# Patient Record
Sex: FEMALE | Race: WHITE | NOT HISPANIC OR LATINO | Employment: FULL TIME | URBAN - METROPOLITAN AREA
[De-identification: names, ages, dates, MRNs, and addresses within clinical notes are randomized per-mention and may not be internally consistent; named-entity substitution may affect disease eponyms.]

---

## 2023-06-05 ENCOUNTER — TELEPHONE (OUTPATIENT)
Dept: PRIMARY CARE | Facility: CLINIC | Age: 63
End: 2023-06-05

## 2023-06-05 ENCOUNTER — OFFICE VISIT (OUTPATIENT)
Dept: PRIMARY CARE | Facility: CLINIC | Age: 63
End: 2023-06-05
Payer: COMMERCIAL

## 2023-06-05 VITALS
HEIGHT: 64 IN | OXYGEN SATURATION: 96 % | DIASTOLIC BLOOD PRESSURE: 74 MMHG | SYSTOLIC BLOOD PRESSURE: 117 MMHG | WEIGHT: 135.4 LBS | TEMPERATURE: 97.9 F | HEART RATE: 80 BPM | BODY MASS INDEX: 23.12 KG/M2

## 2023-06-05 DIAGNOSIS — J20.9 ACUTE BRONCHITIS, UNSPECIFIED ORGANISM: Primary | ICD-10-CM

## 2023-06-05 DIAGNOSIS — J01.00 ACUTE NON-RECURRENT MAXILLARY SINUSITIS: ICD-10-CM

## 2023-06-05 PROBLEM — K43.2 INCISIONAL HERNIA, WITHOUT OBSTRUCTION OR GANGRENE: Status: ACTIVE | Noted: 2023-06-05

## 2023-06-05 PROBLEM — U07.1 COVID-19 VIRUS INFECTION: Status: ACTIVE | Noted: 2023-06-05

## 2023-06-05 PROBLEM — F41.8 SITUATIONAL ANXIETY: Status: ACTIVE | Noted: 2023-06-05

## 2023-06-05 PROBLEM — F40.243 FEAR OF FLYING: Status: ACTIVE | Noted: 2023-06-05

## 2023-06-05 PROBLEM — R92.8 ABNORMAL MAMMOGRAM: Status: ACTIVE | Noted: 2023-06-05

## 2023-06-05 PROBLEM — E78.00 ELEVATED LDL CHOLESTEROL LEVEL: Status: ACTIVE | Noted: 2023-06-05

## 2023-06-05 PROBLEM — E55.9 VITAMIN D INSUFFICIENCY: Status: ACTIVE | Noted: 2023-06-05

## 2023-06-05 PROBLEM — L30.9 ECZEMA: Status: ACTIVE | Noted: 2023-06-05

## 2023-06-05 PROCEDURE — 1036F TOBACCO NON-USER: CPT | Performed by: STUDENT IN AN ORGANIZED HEALTH CARE EDUCATION/TRAINING PROGRAM

## 2023-06-05 PROCEDURE — 99213 OFFICE O/P EST LOW 20 MIN: CPT | Performed by: STUDENT IN AN ORGANIZED HEALTH CARE EDUCATION/TRAINING PROGRAM

## 2023-06-05 RX ORDER — AMOXICILLIN AND CLAVULANATE POTASSIUM 875; 125 MG/1; MG/1
875 TABLET, FILM COATED ORAL 2 TIMES DAILY
Qty: 20 TABLET | Refills: 0 | Status: SHIPPED | OUTPATIENT
Start: 2023-06-05 | End: 2023-06-15

## 2023-06-05 RX ORDER — METHYLPREDNISOLONE 4 MG/1
TABLET ORAL
Qty: 21 TABLET | Refills: 0 | Status: SHIPPED | OUTPATIENT
Start: 2023-06-05 | End: 2023-06-12

## 2023-06-05 RX ORDER — ALBUTEROL SULFATE 90 UG/1
2 AEROSOL, METERED RESPIRATORY (INHALATION) EVERY 6 HOURS PRN
Qty: 18 G | Refills: 11 | Status: SHIPPED | OUTPATIENT
Start: 2023-06-05 | End: 2023-11-06 | Stop reason: SDUPTHER

## 2023-06-05 RX ORDER — FLUTICASONE PROPIONATE 50 MCG
1 SPRAY, SUSPENSION (ML) NASAL DAILY
Qty: 16 G | Refills: 5 | Status: SHIPPED | OUTPATIENT
Start: 2023-06-05 | End: 2023-11-06 | Stop reason: ALTCHOICE

## 2023-06-05 ASSESSMENT — ENCOUNTER SYMPTOMS
DIAPHORESIS: 0
NAUSEA: 1
SINUS PAIN: 0
SINUS PRESSURE: 1
LIGHT-HEADEDNESS: 1
SHORTNESS OF BREATH: 1
SORE THROAT: 1
WHEEZING: 1
VOMITING: 1
RHINORRHEA: 1
DIZZINESS: 0
DIARRHEA: 1
CHILLS: 1
DYSURIA: 0
FATIGUE: 1
CHEST TIGHTNESS: 1

## 2023-06-05 ASSESSMENT — PATIENT HEALTH QUESTIONNAIRE - PHQ9
2. FEELING DOWN, DEPRESSED OR HOPELESS: NOT AT ALL
1. LITTLE INTEREST OR PLEASURE IN DOING THINGS: NOT AT ALL
SUM OF ALL RESPONSES TO PHQ9 QUESTIONS 1 AND 2: 0

## 2023-06-05 NOTE — ASSESSMENT & PLAN NOTE
Symptoms most likely are viral in etiology however with maxillary sinus tenderness and symptoms similar to prior bronchitis will empirically treat with course of Augmentin.  Given the persistent coughing and wheezing with coughing prescribed a Medrol Dosepak as well as albuterol inhaler as needed.  Patient to let us know if using inhaler every day.  Prescribed Flonase to help with postnasal drip.  Advised to drink plenty of fluids rest and try nasal saline rinses.  Advised to consider regular Mucinex instead of Mucinex DM.  Advised of common side effects of medications including insomnia and increased appetite with Medrol, diarrhea with Augmentin and to let us know if persistent, bloody noses with Flonase no regular, and palpitations with albuterol.  Follow-up with me as needed.  Follow-up with Dr. Jacques as previously scheduled.

## 2023-06-05 NOTE — TELEPHONE ENCOUNTER
She says she has bronchitis for the past 2 days, feeling worse today, now sore throat and ear pain, she would like to see you today, please advise

## 2023-06-05 NOTE — PROGRESS NOTES
"Subjective   Patient ID: Michelle Abbott is a 62 y.o. female who presents for Bronchitis.  Friday felt fatigue and took a nap, Saturday felt a little better but Saturday night developed persistent cough. Did covid tests which were negative. Yesterday had chills, yellowish mucus and crackling and can't take deep breaths due to worsening cough. This feels similar to prior bronchitis episodes. Has ear pressure too. Sleeping on 2 pillows at night. Temp was 99-100F at home on Saturday. Taking mucinex dm for coughing q12 hours. No sick contacts. No recent travel.         Review of Systems   Constitutional:  Positive for chills and fatigue. Negative for diaphoresis.   HENT:  Positive for ear pain, postnasal drip, rhinorrhea, sinus pressure (mid frontal) and sore throat. Negative for ear discharge, hearing loss and sinus pain.    Respiratory:  Positive for chest tightness, shortness of breath and wheezing.    Gastrointestinal:  Positive for diarrhea (Last 4 days), nausea and vomiting.   Genitourinary:  Negative for dysuria.   Neurological:  Positive for light-headedness (short lived when standing and resolves within seconds.). Negative for dizziness.       /74   Pulse 80   Temp 36.6 °C (97.9 °F)   Ht 1.613 m (5' 3.5\")   Wt 61.4 kg (135 lb 6.4 oz)   SpO2 96%   BMI 23.61 kg/m²     Objective   Physical Exam  Vitals reviewed.   HENT:      Head: Normocephalic.      Comments: Mid frontal tenderness, bilateral maxillary sinus tenderness present.     Right Ear: Tympanic membrane, ear canal and external ear normal. There is no impacted cerumen.      Left Ear: Tympanic membrane, ear canal and external ear normal. There is no impacted cerumen.      Mouth/Throat:      Mouth: Mucous membranes are moist.      Pharynx: Oropharynx is clear. No oropharyngeal exudate or posterior oropharyngeal erythema.   Cardiovascular:      Rate and Rhythm: Normal rate and regular rhythm.   Pulmonary:      Effort: Pulmonary effort is normal. No " respiratory distress.      Breath sounds: Normal breath sounds. No rhonchi or rales.      Comments: Wheezing when coughing present  Abdominal:      General: There is no distension.   Musculoskeletal:         General: No deformity.      Cervical back: Neck supple. No tenderness.   Lymphadenopathy:      Cervical: No cervical adenopathy.   Skin:     Coloration: Skin is not jaundiced.   Neurological:      Mental Status: She is alert.   Psychiatric:         Mood and Affect: Mood normal.         Behavior: Behavior normal.         Assessment/Plan   Problem List Items Addressed This Visit          Respiratory    Acute bronchitis - Primary    Relevant Medications    albuterol 90 mcg/actuation inhaler    fluticasone (Flonase) 50 mcg/actuation nasal spray    amoxicillin-pot clavulanate (Augmentin) 875-125 mg tablet    methylPREDNISolone (Medrol Dospak) 4 mg tablets       Infectious/Inflammatory    Acute non-recurrent maxillary sinusitis     Symptoms most likely are viral in etiology however with maxillary sinus tenderness and symptoms similar to prior bronchitis will empirically treat with course of Augmentin.  Given the persistent coughing and wheezing with coughing prescribed a Medrol Dosepak as well as albuterol inhaler as needed.  Patient to let us know if using inhaler every day.  Prescribed Flonase to help with postnasal drip.  Advised to drink plenty of fluids rest and try nasal saline rinses.  Advised to consider regular Mucinex instead of Mucinex DM.  Advised of common side effects of medications including insomnia and increased appetite with Medrol, diarrhea with Augmentin and to let us know if persistent, bloody noses with Flonase no regular, and palpitations with albuterol.  Follow-up with me as needed.  Follow-up with Dr. Jacques as previously scheduled.         Relevant Medications    albuterol 90 mcg/actuation inhaler    fluticasone (Flonase) 50 mcg/actuation nasal spray    amoxicillin-pot clavulanate (Augmentin)  875-125 mg tablet    methylPREDNISolone (Medrol Dospak) 4 mg tablets

## 2023-11-05 NOTE — PROGRESS NOTES
Subjective   Patient ID: Michelle Abbott is a 63 y.o. female who presents for her annual physical       She is going to get the influenza vaccine done at work in 2023.    She is scheduled for her mammogram in 12/28/2023 at [place].     She is aware she needs to do the colonoscopy     When she has a full bowel she has pain at the incisional hernia site   When bending over in a certain way she has tenderness to the abdomen     She continues to have a cough since having COVID last year 2022.     She is sleeping well   She has good results with Buspar prn anxiety     HEALTH:  PAP 1/12 -, 12/14, 3/19 normal and Q 5   Vaginal 12/16 -, 3/19   Mammo 9/11, 1/17, 3/19, 10/2020, 10/2021, 12/2022, ordered 11/2023  US of breasts normal 5/19  BD 2007 neg and insurance does not cover BD  Colon 2000, 3/17 and ordered 11/2022 and again 11/2023  Ca cardiac score ordered but not done   EKG 11/10 , 2012, 12/16, 3/19, 10/2021   Urine 2014 , 12/16, 3/19 -, 10/2020   Hep C 3/19 -  Hep B series 2003   FLu 2014 , 9/2016, 2018, 2019, 10/2020, 10/2021, 10/2022 at work   TDAP 12/14   Prevnar never   Pneumovax 11/10   Shingrix- 3/16/2023 and 8/18/2023  Pfizer CVD 3/20/2021 and 4/10/2021 booster 11/26/2021   Ophth- Last seen in 2023. She wears contacts. Mild cataract OD, no glaucoma or MD.         Review of Systems  All systems negative except those listed in the HPI      Past Medical, Surgical, and Family History reviewed and updated in chart.  Reviewed all medications by prescribing practitioner or clinical pharmacist   (such as prescriptions, OTCs, herbal therapies and supplements) and documented in the medical record      Objective   Visit Vitals  /64 (BP Location: Left arm, Patient Position: Sitting)   Pulse 68   Temp 36.2 °C (97.2 °F) (Temporal)    Body mass index is 23.71 kg/m².     Physical Exam  Vitals reviewed.   Constitutional:       Appearance: Normal appearance. She is normal weight.   HENT:      Head: Normocephalic.       Right Ear: Tympanic membrane, ear canal and external ear normal.      Left Ear: Tympanic membrane, ear canal and external ear normal.      Ears:      Comments: Cerumen build up without impaction right ear      Nose: Nose normal.      Mouth/Throat:      Pharynx: Oropharynx is clear.   Eyes:      Conjunctiva/sclera: Conjunctivae normal.   Cardiovascular:      Rate and Rhythm: Normal rate and regular rhythm.      Pulses: Normal pulses.      Heart sounds: Normal heart sounds.   Pulmonary:      Effort: Pulmonary effort is normal.      Breath sounds: Normal breath sounds.      Comments: cough dry, no wheezing, rattles or tightness   Chest:      Comments: Breast exam normal except dense breasts bilaterally  Abdominal:      General: Bowel sounds are normal.      Palpations: Abdomen is soft.      Comments: Small hernia lateral part of the incision to the right noted   Musculoskeletal:         General: Normal range of motion.      Cervical back: Normal range of motion and neck supple.   Skin:     General: Skin is warm.   Neurological:      General: No focal deficit present.      Mental Status: She is alert and oriented to person, place, and time.   Psychiatric:         Mood and Affect: Mood normal.         Behavior: Behavior normal.         Thought Content: Thought content normal.         Judgment: Judgment normal.       Assessment/Plan   Problem List Items Addressed This Visit       Eczema    Elevated LDL cholesterol level    Incisional hernia, without obstruction or gangrene    Vitamin D insufficiency    Relevant Orders    Vitamin D 25-Hydroxy,Total (for eval of Vitamin D levels)     Other Visit Diagnoses       Healthcare maintenance    -  Primary    Relevant Orders    CBC    Comprehensive Metabolic Panel    Lipid Panel    TSH with reflex to Free T4 if abnormal    Visit for screening mammogram        Colon cancer screening        Acute bronchitis, unspecified organism        Acute non-recurrent maxillary sinusitis         Chronic cough                Annual physical completed  Annual labs ordered   Medication reconciliation performed with patient     Health Maintenence completed  -  Discussed healthy diet and regular exercise.    -  Physical exam overall unremarkable. Immunizations reviewed and updated accordingly. Healthy lifestyle choices discussed (tobacco avoidance, appropriate alcohol use, avoidance of illicit substances).   -  Patient is wearing seatbelt.   -  Screening lab work ordered as indicated.    -  Age appropriate screening tests reviewed with patient.     Cerumen build up without impaction right ear:  Recommend using equal parts warm water and peroxide drops in the ear to help with removal and avoid impaction       Her weight/ BMI is in normal range in office, recommend she maintain   Her weight is 136 pounds with BMI at 23.71 IO 11/2023.      BP have been in normal range in office. We will continue to monitor.   EKG was normal 10/2021, no LVH or strain pattern noted   Recommend she monitor her BP at home periodically and call with elevated readings      I have spent 15 min face to face with this patient discussing their cardiac risk and behavioral therapies of nutrition choices and exercise. We are trying to eliminate habits that are contributing to their cardiac risk.  We agreed on a plan of how they can reduce their current CV risk   The patient's 10 yr CV risk was estimated at 4.3 % 11/2023     Elevated lipids: Labs ordered and we will adjust if indicated 11/2023  Explained goal for LDL to be less than 100 and ideally less than 70   Ca cardiac score ordered but not done   Discussed diet and exercise for lipid control      GERD and chronic bronchitis: On exam: cough dry, no wheezing, rattles or tightness 11/2023. She continues to have a cough since having COVID last year 2022.    She has issues with GERD   Continue Mucinex DM for the bronchitis prn  Continue Flonase NS BID and Tessalon Perles 100 mg prn. Refilled  Flonase and Tessalon Perles 11/2023  Continue OTC Pepcid 20 mg QHS for at least 2 months   Continue albuterol inhaler 2 puffs Q 6 hours. Refilled 11/2023  Recommend humidifier use at night during winter months   Recommend using an air purifier in the home   CXR 11/2022 No evidence of acute cardiopulmonary process.      Increased situational anxiety and insomnia: her sleep has improved   She has rare use of Buspar. Buspar helps her sleep and decreases her anxiety   She has a fear of flying and uses Xanax for trips only.   Continue Buspar 5 mg prn. Refilled 11/2023.  Recommend trying Melatonin 5 mcg at night  Discussed mindful meditation and she can look on line at [facility] for guided meditation   Recommend she begin keeping a gratitude journal.   Explained exercise will help with anxiety      RLL pain: This could be adhesions pulling from mesh placement or hernia.   When she has a full bowel she has pain at the incisional hernia site    Small hernia lateral part of the incision to the right noted on exam.   We discussed risks and benefits of hernia removal.  She saw Dr Richardson in general surgery in 4/19  If she experiences nausea, emesis, or acute abdominal pain she is to go to the ED immediately  She is to avoid heavy lifting      Right flank pain, RLQ pain and emesis.   Seen in the ED on 11/23/2020   CT of abdomen 11/2020 showed 2 mm stone with mild hydronephrosis  Urine showed 1+ leukocytes, esterase and bacteria  Given a dose of Rocephin in the ED and discharged on Keflex, Percocet, Zofran and Flomax  Referred to urology - she never followed with urology.   She is doing fine now and changed her diet      She had mesh placed in the bladder in 2012 and that was helpful.     Sebaceous cyst:  It fluctuates in size.  Explained this may have to be removed surgically.      Eczema patch on top of left shoulder is intermittent   She has itching to the area at times and uses cream with good results   Prescription sent in for  Elocon cream to apply prn psoriasis      Vitamin D def: Labs ordered, we will adjust if indicated 11/2023  Continue scripted Vitamin D 50K UT twice weekly     PAP was normal in 3/19 and Q 5.   US of breast in 5/19 was normal   Mammo with jan normal in 12/2022 and ordered 11/2023. She is scheduled for her mammogram in 12/28/2023 at [place].   Breast exam normal except dense breasts bilaterally 11/2023     BD was normal in 2007. Continue OTC Citracal 600 mg BID, scripted Vitamin D 50K UT twice weekly and eat 2 servings of calcium enriched foods daily.  Discussed importance of weight bearing exercises.    Insurance does not cover BD     FmHx of colon cancer -  Colonoscopy 3/17 and Q 5 and ordered 11/2022 and again 11/2023     Ophth-   Last seen in 2023. She wears contacts. Mild cataract OD, no glaucoma or MD.          Hep C 3/19 -  Hep B series 2003   FLu 2014 , 9/2016, 2018, 2019, 10/2020, 10/2021, 10/2022 at work   TDAP 12/14   Prevnar never   Pneumovax 11/10   Shingrix- 3/16/2023 and 8/18/2023  Pfizer CVD 3/20/2021 and 4/10/2021 booster 11/26/2021      Some elements in the chart were copied from Dr. Jacques's last office visit with patient.   Notes have been updated where appropriate, and reflect my current medical decision making from today.      Return in 1 year for CPE or sooner if needed    Scribe Attestation  By signing my name below, I, Elisabet Scherer , Scribe   attest that this documentation has been prepared under the direction and in the presence of Ashleigh Jacques MD.

## 2023-11-06 ENCOUNTER — OFFICE VISIT (OUTPATIENT)
Dept: PRIMARY CARE | Facility: CLINIC | Age: 63
End: 2023-11-06
Payer: COMMERCIAL

## 2023-11-06 ENCOUNTER — LAB (OUTPATIENT)
Dept: LAB | Facility: LAB | Age: 63
End: 2023-11-06
Payer: COMMERCIAL

## 2023-11-06 VITALS
DIASTOLIC BLOOD PRESSURE: 64 MMHG | WEIGHT: 136 LBS | HEIGHT: 64 IN | TEMPERATURE: 97.2 F | BODY MASS INDEX: 23.22 KG/M2 | SYSTOLIC BLOOD PRESSURE: 128 MMHG | OXYGEN SATURATION: 98 % | HEART RATE: 68 BPM

## 2023-11-06 DIAGNOSIS — R05.3 CHRONIC COUGH: ICD-10-CM

## 2023-11-06 DIAGNOSIS — K43.2 INCISIONAL HERNIA, WITHOUT OBSTRUCTION OR GANGRENE: ICD-10-CM

## 2023-11-06 DIAGNOSIS — Z12.11 COLON CANCER SCREENING: ICD-10-CM

## 2023-11-06 DIAGNOSIS — L30.9 ECZEMA, UNSPECIFIED TYPE: ICD-10-CM

## 2023-11-06 DIAGNOSIS — Z12.31 VISIT FOR SCREENING MAMMOGRAM: ICD-10-CM

## 2023-11-06 DIAGNOSIS — Z00.00 HEALTHCARE MAINTENANCE: ICD-10-CM

## 2023-11-06 DIAGNOSIS — F41.8 SITUATIONAL ANXIETY: ICD-10-CM

## 2023-11-06 DIAGNOSIS — E55.9 VITAMIN D INSUFFICIENCY: ICD-10-CM

## 2023-11-06 DIAGNOSIS — Z00.00 HEALTHCARE MAINTENANCE: Primary | ICD-10-CM

## 2023-11-06 DIAGNOSIS — E78.00 ELEVATED LDL CHOLESTEROL LEVEL: ICD-10-CM

## 2023-11-06 PROBLEM — J01.00 ACUTE NON-RECURRENT MAXILLARY SINUSITIS: Status: RESOLVED | Noted: 2023-06-05 | Resolved: 2023-11-06

## 2023-11-06 PROBLEM — J20.9 ACUTE BRONCHITIS: Status: RESOLVED | Noted: 2023-06-05 | Resolved: 2023-11-06

## 2023-11-06 LAB
25(OH)D3 SERPL-MCNC: 26 NG/ML (ref 30–100)
ALBUMIN SERPL BCP-MCNC: 4.3 G/DL (ref 3.4–5)
ALP SERPL-CCNC: 73 U/L (ref 33–136)
ALT SERPL W P-5'-P-CCNC: 10 U/L (ref 7–45)
ANION GAP SERPL CALC-SCNC: 13 MMOL/L (ref 10–20)
AST SERPL W P-5'-P-CCNC: 17 U/L (ref 9–39)
BILIRUB SERPL-MCNC: 0.7 MG/DL (ref 0–1.2)
BUN SERPL-MCNC: 15 MG/DL (ref 6–23)
CALCIUM SERPL-MCNC: 9.6 MG/DL (ref 8.6–10.3)
CHLORIDE SERPL-SCNC: 101 MMOL/L (ref 98–107)
CHOLEST SERPL-MCNC: 246 MG/DL (ref 0–199)
CHOLESTEROL/HDL RATIO: 3.3
CO2 SERPL-SCNC: 29 MMOL/L (ref 21–32)
CREAT SERPL-MCNC: 0.86 MG/DL (ref 0.5–1.05)
ERYTHROCYTE [DISTWIDTH] IN BLOOD BY AUTOMATED COUNT: 13 % (ref 11.5–14.5)
GFR SERPL CREATININE-BSD FRML MDRD: 76 ML/MIN/1.73M*2
GLUCOSE SERPL-MCNC: 99 MG/DL (ref 74–99)
HCT VFR BLD AUTO: 42.8 % (ref 36–46)
HDLC SERPL-MCNC: 75.5 MG/DL
HGB BLD-MCNC: 13.2 G/DL (ref 12–16)
LDLC SERPL CALC-MCNC: 146 MG/DL
MCH RBC QN AUTO: 24.8 PG (ref 26–34)
MCHC RBC AUTO-ENTMCNC: 30.8 G/DL (ref 32–36)
MCV RBC AUTO: 80 FL (ref 80–100)
NON HDL CHOLESTEROL: 171 MG/DL (ref 0–149)
NRBC BLD-RTO: 0 /100 WBCS (ref 0–0)
PLATELET # BLD AUTO: 282 X10*3/UL (ref 150–450)
POTASSIUM SERPL-SCNC: 4.3 MMOL/L (ref 3.5–5.3)
PROT SERPL-MCNC: 7.1 G/DL (ref 6.4–8.2)
RBC # BLD AUTO: 5.33 X10*6/UL (ref 4–5.2)
SODIUM SERPL-SCNC: 139 MMOL/L (ref 136–145)
TRIGL SERPL-MCNC: 123 MG/DL (ref 0–149)
TSH SERPL-ACNC: 2.42 MIU/L (ref 0.44–3.98)
VLDL: 25 MG/DL (ref 0–40)
WBC # BLD AUTO: 5.1 X10*3/UL (ref 4.4–11.3)

## 2023-11-06 PROCEDURE — 36415 COLL VENOUS BLD VENIPUNCTURE: CPT

## 2023-11-06 PROCEDURE — 85027 COMPLETE CBC AUTOMATED: CPT

## 2023-11-06 PROCEDURE — 80061 LIPID PANEL: CPT

## 2023-11-06 PROCEDURE — 1036F TOBACCO NON-USER: CPT | Performed by: INTERNAL MEDICINE

## 2023-11-06 PROCEDURE — 99396 PREV VISIT EST AGE 40-64: CPT | Performed by: INTERNAL MEDICINE

## 2023-11-06 PROCEDURE — 84443 ASSAY THYROID STIM HORMONE: CPT

## 2023-11-06 PROCEDURE — 80053 COMPREHEN METABOLIC PANEL: CPT

## 2023-11-06 PROCEDURE — 82306 VITAMIN D 25 HYDROXY: CPT

## 2023-11-06 RX ORDER — FLUTICASONE PROPIONATE 50 MCG
1 SPRAY, SUSPENSION (ML) NASAL DAILY
Qty: 16 G | Refills: 5 | Status: SHIPPED | OUTPATIENT
Start: 2023-11-06 | End: 2024-11-05

## 2023-11-06 RX ORDER — BUSPIRONE HYDROCHLORIDE 5 MG/1
5 TABLET ORAL 2 TIMES DAILY
Qty: 60 TABLET | Refills: 11 | Status: SHIPPED | OUTPATIENT
Start: 2023-11-06 | End: 2024-11-05

## 2023-11-06 RX ORDER — BENZONATATE 100 MG/1
100 CAPSULE ORAL 3 TIMES DAILY PRN
Qty: 42 CAPSULE | Refills: 0 | Status: SHIPPED | OUTPATIENT
Start: 2023-11-06 | End: 2023-12-06

## 2023-11-06 RX ORDER — ALBUTEROL SULFATE 90 UG/1
2 AEROSOL, METERED RESPIRATORY (INHALATION) EVERY 6 HOURS PRN
Qty: 18 G | Refills: 11 | Status: SHIPPED | OUTPATIENT
Start: 2023-11-06 | End: 2024-11-05

## 2023-12-28 ENCOUNTER — ANCILLARY PROCEDURE (OUTPATIENT)
Dept: RADIOLOGY | Facility: CLINIC | Age: 63
End: 2023-12-28
Payer: COMMERCIAL

## 2023-12-28 DIAGNOSIS — Z12.31 ENCOUNTER FOR SCREENING MAMMOGRAM FOR MALIGNANT NEOPLASM OF BREAST: ICD-10-CM

## 2023-12-28 PROCEDURE — 77067 SCR MAMMO BI INCL CAD: CPT

## 2023-12-28 PROCEDURE — 77067 SCR MAMMO BI INCL CAD: CPT | Mod: BILATERAL PROCEDURE | Performed by: RADIOLOGY

## 2023-12-28 PROCEDURE — 77063 BREAST TOMOSYNTHESIS BI: CPT | Mod: BILATERAL PROCEDURE | Performed by: RADIOLOGY

## 2024-06-18 ENCOUNTER — OFFICE VISIT (OUTPATIENT)
Dept: ORTHOPEDIC SURGERY | Facility: CLINIC | Age: 64
End: 2024-06-18
Payer: COMMERCIAL

## 2024-06-18 ENCOUNTER — HOSPITAL ENCOUNTER (OUTPATIENT)
Dept: RADIOLOGY | Facility: CLINIC | Age: 64
Discharge: HOME | End: 2024-06-18
Payer: COMMERCIAL

## 2024-06-18 DIAGNOSIS — M25.572 LEFT ANKLE PAIN, UNSPECIFIED CHRONICITY: ICD-10-CM

## 2024-06-18 PROCEDURE — 73610 X-RAY EXAM OF ANKLE: CPT | Mod: LT

## 2024-06-18 PROCEDURE — 1036F TOBACCO NON-USER: CPT | Performed by: ORTHOPAEDIC SURGERY

## 2024-06-18 PROCEDURE — 73610 X-RAY EXAM OF ANKLE: CPT | Mod: LEFT SIDE | Performed by: RADIOLOGY

## 2024-06-18 PROCEDURE — 2500000005 HC RX 250 GENERAL PHARMACY W/O HCPCS: Performed by: ORTHOPAEDIC SURGERY

## 2024-06-18 PROCEDURE — 99204 OFFICE O/P NEW MOD 45 MIN: CPT | Performed by: ORTHOPAEDIC SURGERY

## 2024-06-18 PROCEDURE — 20605 DRAIN/INJ JOINT/BURSA W/O US: CPT | Performed by: ORTHOPAEDIC SURGERY

## 2024-06-18 PROCEDURE — 99213 OFFICE O/P EST LOW 20 MIN: CPT | Mod: 25 | Performed by: ORTHOPAEDIC SURGERY

## 2024-06-18 RX ORDER — MELOXICAM 15 MG/1
15 TABLET ORAL
Qty: 30 TABLET | Refills: 2 | Status: SHIPPED | OUTPATIENT
Start: 2024-06-18

## 2024-06-18 RX ORDER — LIDOCAINE HYDROCHLORIDE 10 MG/ML
2 INJECTION INFILTRATION; PERINEURAL
Status: COMPLETED | OUTPATIENT
Start: 2024-06-18 | End: 2024-06-18

## 2024-06-18 NOTE — PROGRESS NOTES
History of Present Illness:   Patient presents today for evaluation of her left heel pain she has had for several months that is progressively getting worse.  She is a history of plantars fasciitis on the contralateral side.  Given her knowledge of this disease process she has tried physical therapy, home exercises, rest, ice, heel lift wedges, heel cups, inserts, as well as anti-inflammatories and ice.  It is becoming progressively more difficult for her to bear weight on the foot as well as plantarflex as well as put on a shoe.  She is inquiring about options for corticosteroid injection today.      Past Medical History:   Diagnosis Date    Acute bronchospasm 12/07/2018    Acute bronchospasm    Acute upper respiratory infection, unspecified 10/30/2020    Acute URI    Chronic sinusitis, unspecified 12/07/2018    Sinobronchitis    Other conditions influencing health status 07/26/2013    Mammogram    Personal history of other diseases of the respiratory system 01/09/2019    History of acute bacterial sinusitis    Personal history of other specified conditions 12/29/2016    History of abdominal pain    Urinary tract infection, site not specified 10/30/2020    Acute urinary tract infection     History reviewed. No pertinent surgical history.    Current Outpatient Medications:     albuterol 90 mcg/actuation inhaler, Inhale 2 puffs every 6 hours if needed for wheezing., Disp: 18 g, Rfl: 11    busPIRone (Buspar) 5 mg tablet, Take 1 tablet (5 mg) by mouth 2 times a day., Disp: 60 tablet, Rfl: 11    fluticasone (Flonase) 50 mcg/actuation nasal spray, Administer 1 spray into each nostril once daily. Shake gently. Before first use, prime pump. After use, clean tip and replace cap., Disp: 16 g, Rfl: 5    meloxicam (Mobic) 15 mg tablet, Take 1 tablet (15 mg) by mouth once daily with breakfast., Disp: 30 tablet, Rfl: 2    Review of Systems   GENERAL: Negative  GI: Negative  MUSCULOSKELETAL: See HPI  SKIN: Negative  NEURO:   Negative    Physical Examination:  Left foot/heel  Tenderness over the lateral inferior aspect of the calcaneus  Pain with plantarflexion  No tenderness of the achilles tendon  Full ROM, appropriate strength  Distal NV exam intact    Imaging:  Plain films of the left heel/foot reveal no acute fracture or dislocation, good alignment position, there is evidence of calcaneal spur along the plantar fascia as well as mild spur towards the insertion of the Achilles tendon.    Assessment:   Patient with left heel pain, likely plantar fasciitis, low clinical suspicion for additional Achilles tendinitis    Plan:  We reviewed with the patient that we feel her tenderness is likely related to plantar fasciitis, however given her minimal posterior calcaneal tenderness as well as pain with plantarflexion we discussed this could also be a small amount of Achilles tendinitis would recommend rest, ice, anti-inflammatories as well as physical therapy with heel lift, boot, as well as possible corticosteroid injection of plantar fascia.    Rodney Hogan PA-C      In a face to face encounter, I evaluated the patient and performed a physical examination, discussed pertinent diagnostic studies if indicated and discussed diagnosis and management strategies with both the patient and physician assistant / nurse practitioner.  I reviewed the PA/NP's note and agree with the documented findings and plan of care.    M Inj/Asp: L ankle on 6/18/2024 4:10 PM  Indications: pain  Details: 22 G needle, posterior approach  Medications: 2 mL lidocaine 10 mg/mL (1 %)  Outcome: tolerated well, no immediate complications  Procedure, treatment alternatives, risks and benefits explained, specific risks discussed. Consent was given by the patient. Immediately prior to procedure a time out was called to verify the correct patient, procedure, equipment, support staff and site/side marked as required. Patient was prepped and draped in the usual sterile  dominic.             Dannie Morales MD

## 2024-11-03 DIAGNOSIS — Z00.00 HEALTHCARE MAINTENANCE: Primary | ICD-10-CM

## 2024-11-03 DIAGNOSIS — Z12.31 VISIT FOR SCREENING MAMMOGRAM: ICD-10-CM

## 2024-11-03 DIAGNOSIS — E55.9 VITAMIN D INSUFFICIENCY: ICD-10-CM

## 2024-11-06 NOTE — PROGRESS NOTES
Subjective   Patient ID: Michelle Abbott is a 64 y.o. female who presents for her annual physical      She is scheduled for her mammogram on 12/30/24  She is going to schedule the colonoscopy     She gets her influenza vaccine at work   She would like to update her TDAP vaccine while she is in the office today     She saw DERECK Hogan in 6/24 and had steroid injection Lt ankle    Her pain is returning. She is stretching her feet on a ball everyday   She has mild LE edema at the end of the day     She has no vaginal pain to report, no discharge. She has vaginal itching.        HEALTH:  PAP 1/12, 12/14, 3/19 normal and sent 11/24   Mammo 3/19, 10/20, 10/21, 12/22, 12/23, ordered 11/24  -- She gets her mammograms at [facility] of breasts normal 5/19  BD 2007 neg and insurance does not cover BD  Colon 2000, 3/17 and ordered 11/2022, 11/2023 and again 11/2024   -- Her father had colon cancer    Ca cardiac score ordered but not done and ordered again 11/24   EKG 2012, 12/16, 3/19, 10/21, 11/24   Urine 2014, 12/16, 3/19, 10/20   Hep C 3/19 -  Hep B series 2003   FLu 2018, 2019, 10/20, 10/21, 10/22, 10/23 at work   TDAP 12/14, 11/7/24  Prevnar never   Pneumovax 11/10   Shingrix- 3/16/2023 and 8/18/2023  Pfizer CVD 3/20/2021 and 4/10/2021 booster 11/26/2021   Ophth- Last seen in 2023. She wears contacts. Mild cataract OD, no glaucoma or MD.         Review of Systems  All systems negative except those listed in the HPI      Past Medical, Surgical, and Family History reviewed and updated in chart.  Reviewed all medications by prescribing practitioner or clinical pharmacist   (such as prescriptions, OTCs, herbal therapies and supplements) and documented in the medical record       Objective   Visit Vitals  /70 (BP Location: Left arm, Patient Position: Sitting, BP Cuff Size: Adult)   Pulse 77   Temp 36.7 °C (98 °F) (Temporal)   Resp 14    Body mass index is 24.52 kg/m².      Physical Exam  Vitals reviewed.    Constitutional:       Appearance: Normal appearance. She is normal weight.   HENT:      Head: Normocephalic.      Right Ear: Tympanic membrane, ear canal and external ear normal.      Left Ear: Tympanic membrane, ear canal and external ear normal.      Nose: Nose normal.      Mouth/Throat:      Pharynx: Oropharynx is clear.   Eyes:      Conjunctiva/sclera: Conjunctivae normal.   Cardiovascular:      Rate and Rhythm: Normal rate and regular rhythm.      Pulses: Normal pulses.      Heart sounds: Normal heart sounds.   Pulmonary:      Effort: Pulmonary effort is normal.      Breath sounds: Normal breath sounds.   Chest:      Comments: Breast exam normal except dense breasts bilaterally   Abdominal:      General: Bowel sounds are normal.      Palpations: Abdomen is soft.   Genitourinary:     General: Normal vulva.      Comments: Vaginal exam normal except vaginal dryness/atrophy noted   Musculoskeletal:         General: Normal range of motion.      Cervical back: Normal range of motion and neck supple.   Skin:     General: Skin is warm.   Neurological:      General: No focal deficit present.      Mental Status: She is alert and oriented to person, place, and time.   Psychiatric:         Mood and Affect: Mood normal.         Behavior: Behavior normal.         Thought Content: Thought content normal.         Judgment: Judgment normal.       Assessment/Plan   Problem List Items Addressed This Visit       Eczema    Elevated LDL cholesterol level    Relevant Orders    CT cardiac scoring wo IV contrast    Incisional hernia, without obstruction or gangrene     Other Visit Diagnoses       Healthcare maintenance    -  Primary    Relevant Orders    Tdap vaccine, age 7 years and older  (BOOSTRIX)    Colon cancer screening        Relevant Orders    Colonoscopy Screening; Average Risk Patient    Family history of colon cancer in father        Relevant Orders    Colonoscopy Screening; Average Risk Patient            Annual physical  completed and Pap will be sent today   Annual labs ordered      Health Maintenence completed  -  Discussed healthy diet and regular exercise.    -  Physical exam overall unremarkable. Immunizations reviewed and updated accordingly. Healthy lifestyle choices discussed (tobacco avoidance, appropriate alcohol use, avoidance of illicit substances).   -  Patient is wearing seatbelt.   -  Screening lab work ordered as indicated.    -  Age appropriate screening tests reviewed with patient.      She is . She denies previous history of tobacco use  She continues to work full time   Her mother is 92 y/o and still living.      Her weight/ BMI is in normal range in office, recommend she maintain   Her weight is 140 pounds with BMI at 24.52 IO 11/2024.      BP have been in normal range in office. We will continue to monitor.   EKG normal sinus 11/24, no LVH or strain pattern noted   Recommend she monitor her BP at home periodically and call with elevated readings      I have spent 15 min face to face with this patient discussing their cardiac risk   We discussed the patients cardiovascular risk. If needed, lifestyle modifications recommended including: behavioral therapies of nutrition choices, exercise and eliminate habits that are contributing to their cardiac risk. We agreed to a plan to decrease his cardiovascular risks. Discussed ASA. Reviewed Guidelines and approved recommendations made to patient.   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   Recommend and orders placed for CT calcium score 11/24   Continue with diet and exercise for lipid control     Chronic bronchitis:   Continue Mucinex DM for the bronchitis prn  Continue Flonase NS BID and Tessalon Perles 100 mg prn.     Continue albuterol inhaler 2  puffs Q 6 hours.    Recommend humidifier use at night during winter months   Recommend using an air purifier in the home   CXR 11/2022 No evidence of acute cardiopulmonary process.       GERD and chronic bronchitis:    She is no longer taking OTC Pepcid       Increased situational anxiety and insomnia:    She has rare use of Buspar. Buspar helps her sleep and decreases her anxiety   She has a fear of flying and uses Xanax for trips only.   Continue Buspar 5 mg prn.    Discussed mindful meditation and she can look on line at [facility] for guided meditation   Explained exercise will help with anxiety      RLQ pain: This could be adhesions pulling from mesh placement or hernia.   When she has a full bowel she has pain at the incisional hernia site    Small hernia lateral part of the incision to the right noted on exam.   She saw Dr Richardson in general surgery in 4/19. She is to avoid heavy lifting      She had mesh placed in the bladder in 2012.     Plantar fascitis:  Xray left heel/foot 6/24 no acute fracture or dislocation, good alignment position, there is evidence of calcaneal spur along the plantar fascia as well as mild spur towards the insertion of the Achilles tendon.    She saw DERECK Hogan in 6/24 and had steroid injection Lt ankle      Sebaceous cyst: It fluctuates in size.  Explained this may have to be removed surgically.      Eczema patch on top of left shoulder is intermittent   She has itching to the area at times and uses cream with good results   Continue Elocon cream to apply prn psoriasis      Vitamin D def: Labs ordered, we will adjust if indicated 11/2023  Discontinue scripted Vitamin D due to concerns for bone loss with higher doses  Recommend beginning OTC Vitamin D3 2000 UT daily      PAP was normal in 3/19 and will be sent today 11/7/24.  She has no vaginal pain to report, no discharge. She has vaginal itching.   Vaginal exam normal except vaginal dryness/atrophy noted 11/24    Discussed vaginal  estrogen cream to help with vaginal atrophy/ dryness 11/24  Prescription sent in for estradiol vaginal cream to use twice weekly 11/24, explained how to apply and where.     US of breast in 5/19 was normal   Mammo with jan normal in 12/2023 and ordered 11/24   Breast exam normal except dense breasts bilaterally 11/2024     BD was normal in 2007. Continue OTC Citracal 600 mg BID, scripted Vitamin D 50K UT twice weekly and eat 2 servings of calcium enriched foods daily.  Discussed importance of weight bearing exercises.    Insurance does not cover BD     Colonoscopy 3/17 and Q 5 and ordered 11/2022, 11/2023 and again 11/2024  -- Her father had colon cancer       Ophth-   Last seen in 2023. She wears contacts. Mild cataract OD, no glaucoma or MD.          Hep C 3/19 -  Hep B series 2003   FLu 2018, 2019, 10/20, 10/21, 10/22, 10/23 at work   TDAP 12/14, 11/7/24  Prevnar never   Pneumovax 11/10   Shingrix- 3/16/2023 and 8/18/2023  Pfizer CVD 3/20/2021 and 4/10/2021 booster 11/26/2021       Some elements in the chart were copied from Dr. Jacques's last office visit with patient.   Notes have been updated where appropriate, and reflect my current medical decision making from today.      Return in 1 year for CPE or sooner if needed  (CPE due 11/25)      Scribe Attestation  By signing my name below, I, Elisabet Byrd , Scribe   attest that this documentation has been prepared under the direction and in the presence of Ashleigh Jacques MD.

## 2024-11-07 ENCOUNTER — APPOINTMENT (OUTPATIENT)
Dept: PRIMARY CARE | Facility: CLINIC | Age: 64
End: 2024-11-07
Payer: COMMERCIAL

## 2024-11-07 VITALS
OXYGEN SATURATION: 98 % | RESPIRATION RATE: 14 BRPM | BODY MASS INDEX: 24.01 KG/M2 | DIASTOLIC BLOOD PRESSURE: 70 MMHG | TEMPERATURE: 98 F | WEIGHT: 140.6 LBS | HEIGHT: 64 IN | SYSTOLIC BLOOD PRESSURE: 120 MMHG | HEART RATE: 77 BPM

## 2024-11-07 DIAGNOSIS — Z12.4 PAP SMEAR FOR CERVICAL CANCER SCREENING: ICD-10-CM

## 2024-11-07 DIAGNOSIS — Z00.00 HEALTHCARE MAINTENANCE: Primary | ICD-10-CM

## 2024-11-07 DIAGNOSIS — K43.2 INCISIONAL HERNIA, WITHOUT OBSTRUCTION OR GANGRENE: ICD-10-CM

## 2024-11-07 DIAGNOSIS — N95.2 VAGINAL ATROPHY: ICD-10-CM

## 2024-11-07 DIAGNOSIS — Z12.11 COLON CANCER SCREENING: ICD-10-CM

## 2024-11-07 DIAGNOSIS — E78.00 ELEVATED LDL CHOLESTEROL LEVEL: ICD-10-CM

## 2024-11-07 DIAGNOSIS — M72.2 PLANTAR FASCIITIS, BILATERAL: ICD-10-CM

## 2024-11-07 DIAGNOSIS — Z80.0 FAMILY HISTORY OF COLON CANCER IN FATHER: ICD-10-CM

## 2024-11-07 DIAGNOSIS — L30.9 ECZEMA, UNSPECIFIED TYPE: ICD-10-CM

## 2024-11-07 PROCEDURE — 1036F TOBACCO NON-USER: CPT | Performed by: INTERNAL MEDICINE

## 2024-11-07 PROCEDURE — 99396 PREV VISIT EST AGE 40-64: CPT | Performed by: INTERNAL MEDICINE

## 2024-11-07 PROCEDURE — 90471 IMMUNIZATION ADMIN: CPT | Performed by: INTERNAL MEDICINE

## 2024-11-07 PROCEDURE — 90715 TDAP VACCINE 7 YRS/> IM: CPT | Performed by: INTERNAL MEDICINE

## 2024-11-07 PROCEDURE — 93000 ELECTROCARDIOGRAM COMPLETE: CPT | Performed by: INTERNAL MEDICINE

## 2024-11-07 PROCEDURE — 3008F BODY MASS INDEX DOCD: CPT | Performed by: INTERNAL MEDICINE

## 2024-11-07 PROCEDURE — 87624 HPV HI-RISK TYP POOLED RSLT: CPT

## 2024-11-07 RX ORDER — IBUPROFEN 600 MG/1
600 TABLET ORAL 3 TIMES DAILY PRN
Qty: 270 TABLET | Refills: 0 | Status: SHIPPED | OUTPATIENT
Start: 2024-11-07 | End: 2025-11-07

## 2024-11-07 RX ORDER — ESTRADIOL 0.1 MG/G
CREAM VAGINAL
Qty: 42.5 G | Refills: 1 | Status: SHIPPED | OUTPATIENT
Start: 2024-11-07

## 2024-11-07 ASSESSMENT — PATIENT HEALTH QUESTIONNAIRE - PHQ9
2. FEELING DOWN, DEPRESSED OR HOPELESS: NOT AT ALL
SUM OF ALL RESPONSES TO PHQ9 QUESTIONS 1 AND 2: 0
1. LITTLE INTEREST OR PLEASURE IN DOING THINGS: NOT AT ALL
1. LITTLE INTEREST OR PLEASURE IN DOING THINGS: NOT AT ALL
SUM OF ALL RESPONSES TO PHQ9 QUESTIONS 1 AND 2: 0
2. FEELING DOWN, DEPRESSED OR HOPELESS: NOT AT ALL

## 2024-11-11 ENCOUNTER — TELEPHONE (OUTPATIENT)
Dept: GASTROENTEROLOGY | Facility: EXTERNAL LOCATION | Age: 64
End: 2024-11-11
Payer: COMMERCIAL

## 2024-11-18 DIAGNOSIS — Z12.11 COLON CANCER SCREENING: Primary | ICD-10-CM

## 2024-11-18 RX ORDER — SOD SULF/POT CHLORIDE/MAG SULF 1.479 G
12 TABLET ORAL 2 TIMES DAILY
Qty: 24 TABLET | Refills: 0 | Status: SHIPPED | OUTPATIENT
Start: 2024-11-18

## 2024-12-09 ENCOUNTER — ANESTHESIA EVENT (OUTPATIENT)
Dept: GASTROENTEROLOGY | Facility: EXTERNAL LOCATION | Age: 64
End: 2024-12-09

## 2024-12-12 ENCOUNTER — ANESTHESIA (OUTPATIENT)
Dept: GASTROENTEROLOGY | Facility: EXTERNAL LOCATION | Age: 64
End: 2024-12-12

## 2024-12-12 ENCOUNTER — APPOINTMENT (OUTPATIENT)
Dept: GASTROENTEROLOGY | Facility: EXTERNAL LOCATION | Age: 64
End: 2024-12-12
Payer: COMMERCIAL

## 2024-12-12 VITALS
OXYGEN SATURATION: 98 % | DIASTOLIC BLOOD PRESSURE: 69 MMHG | BODY MASS INDEX: 23.73 KG/M2 | HEIGHT: 64 IN | TEMPERATURE: 96.8 F | WEIGHT: 139 LBS | HEART RATE: 64 BPM | RESPIRATION RATE: 13 BRPM | SYSTOLIC BLOOD PRESSURE: 115 MMHG

## 2024-12-12 DIAGNOSIS — Z12.11 COLON CANCER SCREENING: ICD-10-CM

## 2024-12-12 DIAGNOSIS — K50.00 TERMINAL ILEITIS WITHOUT COMPLICATION (MULTI): Primary | ICD-10-CM

## 2024-12-12 DIAGNOSIS — Z80.0 FAMILY HISTORY OF COLON CANCER IN FATHER: ICD-10-CM

## 2024-12-12 PROCEDURE — 88305 TISSUE EXAM BY PATHOLOGIST: CPT | Mod: TC,ELYLAB | Performed by: INTERNAL MEDICINE

## 2024-12-12 PROCEDURE — 88305 TISSUE EXAM BY PATHOLOGIST: CPT | Performed by: STUDENT IN AN ORGANIZED HEALTH CARE EDUCATION/TRAINING PROGRAM

## 2024-12-12 PROCEDURE — 45380 COLONOSCOPY AND BIOPSY: CPT | Performed by: INTERNAL MEDICINE

## 2024-12-12 RX ORDER — PROPOFOL 10 MG/ML
INJECTION, EMULSION INTRAVENOUS AS NEEDED
Status: DISCONTINUED | OUTPATIENT
Start: 2024-12-12 | End: 2024-12-12

## 2024-12-12 RX ORDER — LIDOCAINE HYDROCHLORIDE 20 MG/ML
INJECTION, SOLUTION INFILTRATION; PERINEURAL AS NEEDED
Status: DISCONTINUED | OUTPATIENT
Start: 2024-12-12 | End: 2024-12-12

## 2024-12-12 RX ORDER — ONDANSETRON HYDROCHLORIDE 2 MG/ML
INJECTION, SOLUTION INTRAVENOUS AS NEEDED
Status: DISCONTINUED | OUTPATIENT
Start: 2024-12-12 | End: 2024-12-12

## 2024-12-12 SDOH — HEALTH STABILITY: MENTAL HEALTH: CURRENT SMOKER: 0

## 2024-12-12 ASSESSMENT — COLUMBIA-SUICIDE SEVERITY RATING SCALE - C-SSRS
6. HAVE YOU EVER DONE ANYTHING, STARTED TO DO ANYTHING, OR PREPARED TO DO ANYTHING TO END YOUR LIFE?: NO
2. HAVE YOU ACTUALLY HAD ANY THOUGHTS OF KILLING YOURSELF?: NO
1. IN THE PAST MONTH, HAVE YOU WISHED YOU WERE DEAD OR WISHED YOU COULD GO TO SLEEP AND NOT WAKE UP?: NO

## 2024-12-12 ASSESSMENT — PAIN - FUNCTIONAL ASSESSMENT
PAIN_FUNCTIONAL_ASSESSMENT: 0-10

## 2024-12-12 ASSESSMENT — PAIN SCALES - GENERAL
PAINLEVEL_OUTOF10: 0 - NO PAIN
PAIN_LEVEL: 0

## 2024-12-12 NOTE — DISCHARGE INSTRUCTIONS

## 2024-12-12 NOTE — ANESTHESIA POSTPROCEDURE EVALUATION
Patient: Michelle Abbott    Procedure Summary       Date: 12/12/24 Room / Location: [facility]    Anesthesia Start: 1043 Anesthesia Stop:     Procedure: COLONOSCOPY Diagnosis: Family history of colon cancer in father    Scheduled Providers: Sagar Stiles MD Responsible Provider: MATT Reddy    Anesthesia Type: MAC ASA Status: 2            Anesthesia Type: MAC    Vitals Value Taken Time   BP 93/59 12/12/24 1106   Temp 36 °C (96.8 °F) 12/12/24 1106   Pulse 70 12/12/24 1106   Resp 17 12/12/24 1106   SpO2 96 % 12/12/24 1106       Anesthesia Post Evaluation    Patient location during evaluation: bedside  Patient participation: complete - patient participated  Level of consciousness: sedated  Pain score: 0  Pain management: adequate  Airway patency: patent  Cardiovascular status: acceptable  Respiratory status: acceptable  Hydration status: acceptable  Postoperative Nausea and Vomiting: none        No notable events documented.

## 2024-12-12 NOTE — H&P
Procedure H&P    Patient Profile-Procedures  Name Michelle Abbott  Date of Birth 1960  MRN [id number]  Address   [address] [place] [address]   [facility] [id number] [facility]   [facility] [id number]    Primary Phone Number [phone]  Secondary Phone Number    Ashleigh Luna    Procedure(s):  Procedures: Colonoscopy  Primary contact name and number   Extended Emergency Contact Information  Primary Emergency Contact: Rivera Abbott  Home Phone: [phone]  Work Phone: [phone]  Relation: Other    General Health  Weight   Vitals:    12/12/24 1026   Weight: 63 kg (139 lb)     BMI Body mass index is 23.86 kg/m².    Allergies  Allergies   Allergen Reactions    Codeine Other     hallucinations    Sulfa (Sulfonamide Antibiotics) Unknown       Past Medical History   Past Medical History:   Diagnosis Date    Acute bronchospasm 12/07/2018    Acute bronchospasm    Acute upper respiratory infection, unspecified 10/30/2020    Acute URI    Asthma     Chronic sinusitis, unspecified 12/07/2018    Sinobronchitis    Other conditions influencing health status 07/26/2013    Mammogram    Personal history of other diseases of the respiratory system 01/09/2019    History of acute bacterial sinusitis    Personal history of other specified conditions 12/29/2016    History of abdominal pain    Urinary tract infection, site not specified 10/30/2020    Acute urinary tract infection       Provider assessment  Diagnosis: Colon Cancer Screening/Surveillance   Medication Reviewed - yes  Prior to Admission medications    Medication Sig Start Date End Date Taking? Authorizing Provider   estradiol (Estrace) 0.01 % (0.1 mg/gram) vaginal cream Apply pea size amount to vagina nightly twice a week 11/7/24  Yes Ashleigh Jacques MD   ibuprofen 600 mg tablet Take 1 tablet (600 mg) by mouth 3 times a day as needed for mild pain (1 - 3) (pain). 11/7/24 11/7/25 Yes Ashleigh Jacques MD   albuterol 90 mcg/actuation inhaler Inhale 2 puffs every  6 hours if needed for wheezing. 11/6/23 11/5/24  Ashleigh Jacques MD   fluticasone (Flonase) 50 mcg/actuation nasal spray Administer 1 spray into each nostril once daily. Shake gently. Before first use, prime pump. After use, clean tip and replace cap. 11/6/23 11/5/24  Ashleigh Jacques MD   sod sulf-pot chloride-mag sulf (Sutab) 1.479-0.188- 0.225 gram tablet Take 12 tablets by mouth 2 times a day. 11/18/24 12/12/24  Sagar Stiles MD       Physical Exam  Vitals:    12/12/24 1026   BP: 124/72   Pulse: 73   Resp: 22   Temp: 36.8 °C (98.2 °F)   SpO2: 98%        General: A&Ox3, NAD.  HEENT: AT/NC.   CV: RRR. No murmur.  Resp: CTA bilaterally. No wheezing, rhonchi or rales.   GI: Soft, NT/ND. BSx4.  Extrem: No edema. Pulses intact.  Neuro: No focal deficits.   Psych: Normal mood and affect.      Procedure Plan - pre-procedural (re)assesment completed by physician:  discharge/transfer patient when discharge criteria met    ASA status 2  Mallampati score 2    Sagar Stiles MD  12/12/2024 10:43 AM

## 2024-12-12 NOTE — ANESTHESIA PREPROCEDURE EVALUATION
Patient: Michelle Abbott    Procedure Information       Date/Time: 12/12/24 1000    Scheduled providers: Sagar Stiles MD    Procedure: COLONOSCOPY    Location: [facility]            Relevant Problems   Anesthesia (within normal limits)      Cardiac (within normal limits)      Pulmonary (within normal limits)      Neuro (within normal limits)      GI (within normal limits)      /Renal (within normal limits)      Liver (within normal limits)      Endocrine (within normal limits)      Hematology (within normal limits)      Musculoskeletal (within normal limits)      HEENT (within normal limits)      ID   (+) COVID-19 virus infection      Skin   (+) Eczema      GYN (within normal limits)       Clinical information reviewed:   Tobacco  Allergies  Meds   Med Hx  Surg Hx  OB Status  Fam Hx  Soc   Hx        NPO Detail:  NPO/Void Status  Date of Last Liquid: 12/12/24  Time of Last Liquid: 0700  Date of Last Solid: 12/11/24  Last Intake Type: Clear fluids         Physical Exam    Airway  Mallampati: II  TM distance: >3 FB  Neck ROM: full     Cardiovascular   Rhythm: regular  Rate: normal     Dental    Pulmonary   Breath sounds clear to auscultation     Abdominal   Abdomen: soft  Bowel sounds: normal           Anesthesia Plan    History of general anesthesia?: yes  History of complications of general anesthesia?: yes    ASA 2     MAC     The patient is not a current smoker.  Patient was not previously instructed to abstain from smoking on day of procedure.  Education provided regarding risk of obstructive sleep apnea.  intravenous induction   Anesthetic plan and risks discussed with patient.    Plan discussed with CRNA.    PONV  Hx

## 2024-12-22 DIAGNOSIS — N95.2 VAGINAL ATROPHY: ICD-10-CM

## 2024-12-22 LAB
LABORATORY COMMENT REPORT: NORMAL
PATH REPORT.FINAL DX SPEC: NORMAL
PATH REPORT.GROSS SPEC: NORMAL
PATH REPORT.RELEVANT HX SPEC: NORMAL
PATH REPORT.TOTAL CANCER: NORMAL

## 2024-12-23 RX ORDER — ESTRADIOL 0.1 MG/G
CREAM VAGINAL
Qty: 126 G | Refills: 1 | Status: SHIPPED | OUTPATIENT
Start: 2024-12-23

## 2024-12-30 ENCOUNTER — HOSPITAL ENCOUNTER (OUTPATIENT)
Dept: RADIOLOGY | Facility: CLINIC | Age: 64
Discharge: HOME | End: 2024-12-30
Payer: COMMERCIAL

## 2024-12-30 VITALS — BODY MASS INDEX: 23.92 KG/M2 | WEIGHT: 135 LBS | HEIGHT: 63 IN

## 2024-12-30 DIAGNOSIS — Z12.31 ENCOUNTER FOR SCREENING MAMMOGRAM FOR MALIGNANT NEOPLASM OF BREAST: ICD-10-CM

## 2024-12-30 PROCEDURE — 77063 BREAST TOMOSYNTHESIS BI: CPT

## 2025-01-03 ENCOUNTER — HOSPITAL ENCOUNTER (OUTPATIENT)
Dept: RADIOLOGY | Facility: HOSPITAL | Age: 65
Discharge: HOME | End: 2025-01-03
Payer: COMMERCIAL

## 2025-01-03 DIAGNOSIS — K50.00 TERMINAL ILEITIS WITHOUT COMPLICATION (MULTI): ICD-10-CM

## 2025-01-03 PROCEDURE — 2550000001 HC RX 255 CONTRASTS: Performed by: INTERNAL MEDICINE

## 2025-01-03 PROCEDURE — 74177 CT ABD & PELVIS W/CONTRAST: CPT

## 2025-01-03 PROCEDURE — 2500000005 HC RX 250 GENERAL PHARMACY W/O HCPCS: Performed by: INTERNAL MEDICINE

## 2025-01-03 RX ADMIN — IOHEXOL 75 ML: 350 INJECTION, SOLUTION INTRAVENOUS at 16:53

## 2025-01-03 RX ADMIN — Medication 1000 ML: at 15:45

## 2025-01-16 ENCOUNTER — APPOINTMENT (OUTPATIENT)
Dept: GASTROENTEROLOGY | Facility: CLINIC | Age: 65
End: 2025-01-16
Payer: COMMERCIAL

## 2025-01-16 VITALS
DIASTOLIC BLOOD PRESSURE: 69 MMHG | WEIGHT: 137.2 LBS | TEMPERATURE: 97.3 F | SYSTOLIC BLOOD PRESSURE: 115 MMHG | BODY MASS INDEX: 24.31 KG/M2 | HEART RATE: 88 BPM | OXYGEN SATURATION: 96 % | HEIGHT: 63 IN

## 2025-01-16 DIAGNOSIS — K50.018 CROHN'S DISEASE OF SMALL INTESTINE WITH OTHER COMPLICATION: Primary | ICD-10-CM

## 2025-01-16 PROCEDURE — 99214 OFFICE O/P EST MOD 30 MIN: CPT | Performed by: REGISTERED NURSE

## 2025-01-16 PROCEDURE — 3008F BODY MASS INDEX DOCD: CPT | Performed by: REGISTERED NURSE

## 2025-01-16 PROCEDURE — 1036F TOBACCO NON-USER: CPT | Performed by: REGISTERED NURSE

## 2025-01-16 RX ORDER — BUDESONIDE 3 MG/1
3 CAPSULE, COATED PELLETS ORAL EVERY MORNING
Qty: 90 CAPSULE | Refills: 1 | Status: SHIPPED | OUTPATIENT
Start: 2025-01-16

## 2025-01-16 ASSESSMENT — ENCOUNTER SYMPTOMS
ABDOMINAL PAIN: 1
ENDOCRINE NEGATIVE: 1
MYALGIAS: 0
APPETITE CHANGE: 0
SHORTNESS OF BREATH: 0
UNEXPECTED WEIGHT CHANGE: 0
DIFFICULTY URINATING: 0
JOINT SWELLING: 0
EYE PAIN: 0
NERVOUS/ANXIOUS: 0
EYES NEGATIVE: 1
ARTHRALGIAS: 0
COUGH: 0

## 2025-01-17 ENCOUNTER — LAB (OUTPATIENT)
Dept: LAB | Facility: LAB | Age: 65
End: 2025-01-17
Payer: COMMERCIAL

## 2025-01-17 DIAGNOSIS — K50.018 CROHN'S DISEASE OF SMALL INTESTINE WITH OTHER COMPLICATION: ICD-10-CM

## 2025-01-17 DIAGNOSIS — E55.9 VITAMIN D INSUFFICIENCY: ICD-10-CM

## 2025-01-17 DIAGNOSIS — Z00.00 HEALTHCARE MAINTENANCE: ICD-10-CM

## 2025-01-17 LAB
25(OH)D3 SERPL-MCNC: 16 NG/ML (ref 30–100)
ALBUMIN SERPL BCP-MCNC: 4.2 G/DL (ref 3.4–5)
ALP SERPL-CCNC: 67 U/L (ref 33–136)
ALT SERPL W P-5'-P-CCNC: 9 U/L (ref 7–45)
ANION GAP SERPL CALC-SCNC: 13 MMOL/L (ref 10–20)
AST SERPL W P-5'-P-CCNC: 15 U/L (ref 9–39)
BILIRUB SERPL-MCNC: 0.7 MG/DL (ref 0–1.2)
BUN SERPL-MCNC: 16 MG/DL (ref 6–23)
CALCIUM SERPL-MCNC: 9.4 MG/DL (ref 8.6–10.3)
CHLORIDE SERPL-SCNC: 105 MMOL/L (ref 98–107)
CHOLEST SERPL-MCNC: 227 MG/DL (ref 0–199)
CHOLESTEROL/HDL RATIO: 3.7
CO2 SERPL-SCNC: 27 MMOL/L (ref 21–32)
CREAT SERPL-MCNC: 0.87 MG/DL (ref 0.5–1.05)
CRP SERPL-MCNC: 0.4 MG/DL
EGFRCR SERPLBLD CKD-EPI 2021: 75 ML/MIN/1.73M*2
ERYTHROCYTE [DISTWIDTH] IN BLOOD BY AUTOMATED COUNT: 13.4 % (ref 11.5–14.5)
ERYTHROCYTE [SEDIMENTATION RATE] IN BLOOD BY WESTERGREN METHOD: 9 MM/H (ref 0–30)
FOLATE SERPL-MCNC: 10.5 NG/ML
GLUCOSE SERPL-MCNC: 88 MG/DL (ref 74–99)
HBV CORE AB SER QL: NONREACTIVE
HBV SURFACE AB SER-ACNC: 3.6 MIU/ML
HBV SURFACE AG SERPL QL IA: NONREACTIVE
HCT VFR BLD AUTO: 43.5 % (ref 36–46)
HDLC SERPL-MCNC: 61.4 MG/DL
HGB BLD-MCNC: 13.7 G/DL (ref 12–16)
LDLC SERPL CALC-MCNC: 135 MG/DL
MCH RBC QN AUTO: 25.5 PG (ref 26–34)
MCHC RBC AUTO-ENTMCNC: 31.5 G/DL (ref 32–36)
MCV RBC AUTO: 81 FL (ref 80–100)
NON HDL CHOLESTEROL: 166 MG/DL (ref 0–149)
NRBC BLD-RTO: 0 /100 WBCS (ref 0–0)
PLATELET # BLD AUTO: 287 X10*3/UL (ref 150–450)
POTASSIUM SERPL-SCNC: 4.3 MMOL/L (ref 3.5–5.3)
PROT SERPL-MCNC: 6.8 G/DL (ref 6.4–8.2)
RBC # BLD AUTO: 5.37 X10*6/UL (ref 4–5.2)
SODIUM SERPL-SCNC: 141 MMOL/L (ref 136–145)
TRIGL SERPL-MCNC: 155 MG/DL (ref 0–149)
TSH SERPL-ACNC: 2.96 MIU/L (ref 0.44–3.98)
VIT B12 SERPL-MCNC: 203 PG/ML (ref 211–911)
VLDL: 31 MG/DL (ref 0–40)
WBC # BLD AUTO: 5.4 X10*3/UL (ref 4.4–11.3)

## 2025-01-17 PROCEDURE — 86704 HEP B CORE ANTIBODY TOTAL: CPT

## 2025-01-17 PROCEDURE — 86140 C-REACTIVE PROTEIN: CPT

## 2025-01-17 PROCEDURE — 88346 IMFLUOR 1ST 1ANTB STAIN PX: CPT

## 2025-01-17 PROCEDURE — 80061 LIPID PANEL: CPT

## 2025-01-17 PROCEDURE — 82607 VITAMIN B-12: CPT

## 2025-01-17 PROCEDURE — 83520 IMMUNOASSAY QUANT NOS NONAB: CPT

## 2025-01-17 PROCEDURE — 85027 COMPLETE CBC AUTOMATED: CPT

## 2025-01-17 PROCEDURE — 82746 ASSAY OF FOLIC ACID SERUM: CPT

## 2025-01-17 PROCEDURE — 86481 TB AG RESPONSE T-CELL SUSP: CPT

## 2025-01-17 PROCEDURE — 85652 RBC SED RATE AUTOMATED: CPT

## 2025-01-17 PROCEDURE — 36415 COLL VENOUS BLD VENIPUNCTURE: CPT

## 2025-01-17 PROCEDURE — 86706 HEP B SURFACE ANTIBODY: CPT

## 2025-01-17 PROCEDURE — 87340 HEPATITIS B SURFACE AG IA: CPT

## 2025-01-17 PROCEDURE — 80053 COMPREHEN METABOLIC PANEL: CPT

## 2025-01-17 PROCEDURE — 82397 CHEMILUMINESCENT ASSAY: CPT

## 2025-01-17 PROCEDURE — 82306 VITAMIN D 25 HYDROXY: CPT

## 2025-01-17 PROCEDURE — 84443 ASSAY THYROID STIM HORMONE: CPT

## 2025-01-17 PROCEDURE — 81479 UNLISTED MOLECULAR PATHOLOGY: CPT

## 2025-01-17 PROCEDURE — 88350 IMFLUOR EA ADDL 1ANTB STN PX: CPT

## 2025-01-20 ENCOUNTER — TELEPHONE (OUTPATIENT)
Dept: GASTROENTEROLOGY | Facility: CLINIC | Age: 65
End: 2025-01-20
Payer: COMMERCIAL

## 2025-01-20 LAB
NIL(NEG) CONTROL SPOT COUNT: NORMAL
PANEL A SPOT COUNT: 3
PANEL B SPOT COUNT: 2
POS CONTROL SPOT COUNT: NORMAL
T-SPOT. TB INTERPRETATION: NEGATIVE

## 2025-01-20 NOTE — RESULT ENCOUNTER NOTE
Francisco Martinez-  The Vitamin D is really low and take 5000 u vt D 3 a day   The cholesterol is still showing elevated LDL and decent HDL and still goal is to get the LDL <100  Thyroid was normal   Rest of the labs are good range

## 2025-01-20 NOTE — TELEPHONE ENCOUNTER
Answered all patients questions at this time regarding budesonide script.    Chief Complaint  Diabetes    Subjective          Christian Maldonado presents to [facility]  Diabetes  He has type 2 diabetes mellitus. No MedicAlert identification noted. The initial diagnosis of diabetes was made 15 years ago. Pertinent negatives for hypoglycemia include no confusion, headaches, speech difficulty, sweats or tremors. Pertinent negatives for diabetes include no blurred vision, no foot paresthesias, no foot ulcerations, no polydipsia, no polyuria and no weight loss. Symptoms are stable. He is compliant with treatment some of the time. He is following a generally unhealthy diet. When asked about meal planning, he reported none. He participates in exercise intermittently. He does not see a podiatrist. Eye exam is current.       Hypertension  This is a chronic problem. The current episode started more than 1 year ago. The problem is controlled. Pertinent negatives include no anxiety, blurred vision, chest pain, headaches, malaise/fatigue, neck pain, orthopnea, palpitations, peripheral edema, PND, shortness of breath or sweats. Risk factors for coronary artery disease include diabetes mellitus, dyslipidemia, family history and male gender. Past treatments include ACE inhibitors. Current antihypertension treatment includes ACE inhibitors and diuretics. The current treatment provides moderate improvement. There are no compliance problems.  There is no history of CVA, PVD or retinopathy.   Heartburn  He complains of heartburn. He reports no chest pain. This is a chronic problem. The current episode started more than 1 year ago. The problem occurs occasionally. Pertinent negatives include no fatigue or weight loss. He has tried a histamine-2 antagonist for the symptoms. The treatment provided moderate relief.       Review of Systems   Constitutional:  Negative for weight loss.   Eyes:  Negative for blurred vision.   Endocrine: Negative for polydipsia and polyuria.   Neurological:   "Negative for tremors, speech difficulty and headaches.   Psychiatric/Behavioral:  Negative for confusion.          Objective   Vital Signs:   /80 (BP Location: Right arm, Patient Position: Sitting, Cuff Size: Adult)   Pulse 92   Temp 98.2 °F (36.8 °C) (Temporal)   Ht 167.6 cm (66\")   Wt 74.4 kg (164 lb)   SpO2 97%   BMI 26.47 kg/m²     Physical Exam  Constitutional:       General: He is not in acute distress.     Appearance: Normal appearance. He is well-developed and well-groomed. He is not ill-appearing, toxic-appearing or diaphoretic.   HENT:      Head: Normocephalic.      Nose: Nose normal. No congestion or rhinorrhea.      Mouth/Throat:      Mouth: Mucous membranes are moist.      Pharynx: Oropharynx is clear. No oropharyngeal exudate or posterior oropharyngeal erythema.   Eyes:      General: Lids are normal.         Right eye: No discharge.         Left eye: No discharge.      Extraocular Movements: Extraocular movements intact.      Pupils: Pupils are equal, round, and reactive to light.   Neck:      Vascular: No carotid bruit.   Cardiovascular:      Rate and Rhythm: Normal rate and regular rhythm.      Pulses: Normal pulses.      Heart sounds: Normal heart sounds. No murmur heard.     No friction rub. No gallop.   Pulmonary:      Effort: Pulmonary effort is normal. No respiratory distress.      Breath sounds: Normal breath sounds. No stridor. No wheezing, rhonchi or rales.   Chest:      Chest wall: No tenderness.   Abdominal:      General: Bowel sounds are normal. There is no distension.      Palpations: Abdomen is soft. There is no mass.      Tenderness: There is no abdominal tenderness. There is no right CVA tenderness, left CVA tenderness, guarding or rebound.      Hernia: No hernia is present.   Musculoskeletal:         General: No swelling or tenderness. Normal range of motion.      Cervical back: Normal range of motion and neck supple. No rigidity or tenderness.      Right lower leg: No " edema.      Left lower leg: No edema.   Lymphadenopathy:      Cervical: No cervical adenopathy.   Skin:     General: Skin is warm.      Capillary Refill: Capillary refill takes less than 2 seconds.      Coloration: Skin is not jaundiced.      Findings: No bruising, erythema or rash.   Neurological:      General: No focal deficit present.      Mental Status: He is alert and oriented to person, place, and time.      Motor: Motor function is intact. No weakness.      Coordination: Coordination is intact.      Gait: Gait is intact. Gait normal.   Psychiatric:         Attention and Perception: Attention normal.         Mood and Affect: Mood normal.         Speech: Speech normal.         Behavior: Behavior normal.         Cognition and Memory: Cognition normal.         Judgment: Judgment normal.        Result Review :                 Assessment and Plan    Diagnoses and all orders for this visit:    1. Hyperlipidemia, unspecified hyperlipidemia type  -     icosapent ethyl (Vascepa) 1 g capsule capsule; Take 2 capsules by mouth 2 (Two) Times a Day With Meals.  Dispense: 120 capsule; Refill: 2    2. Type 2 diabetes mellitus with hyperglycemia, without long-term current use of insulin  -     MicroAlbumin, Urine, Random - Urine, Clean Catch; Future  -     Dulaglutide 3 MG/0.5ML solution pen-injector; Inject 0.5 mL under the skin 1 (One) Time Per Week.  Dispense: 6 mL; Refill: 1  -     empagliflozin (JARDIANCE) 25 MG tablet tablet; Take 1 tablet by mouth every morning.  Dispense: 90 tablet; Refill: 1  -     CBC Auto Differential; Future  -     Comprehensive Metabolic Panel; Future  -     Hemoglobin A1c; Future  -     Lipid Panel; Future  -     TSH; Future  -     T4, Free; Future  -     MicroAlbumin, Urine, Random - Urine, Clean Catch  -     CBC Auto Differential  -     Comprehensive Metabolic Panel  -     Hemoglobin A1c  -     Lipid Panel  -     TSH  -     T4, Free      Patient's Body mass index is 26.47 kg/m². indicating that  he is overweight (BMI 25-29.9). Patient's (Body mass index is 26.47 kg/m².) indicates that they are overweight with health conditions that include hypertension, diabetes mellitus, and dyslipidemias . Weight is unchanged. BMI is is above average; BMI management plan is completed. We discussed low calorie, low carb based diet program, portion control, and increasing exercise. .    Follow Up   Return in about 3 months (around 7/10/2024), or labs today.  Patient was given instructions and counseling regarding his condition or for health maintenance advice. Please see specific information pulled into the AVS if appropriate.     This document has been electronically signed by LUAN Juares  April 10, 2024 13:07 EDT

## 2025-01-23 LAB — SCAN RESULT: NORMAL

## 2025-01-28 DIAGNOSIS — K50.018 CROHN'S DISEASE OF SMALL INTESTINE WITH OTHER COMPLICATION: ICD-10-CM

## 2025-01-28 PROBLEM — K50.00 CROHN'S DISEASE OF SMALL INTESTINE: Status: ACTIVE | Noted: 2025-01-28

## 2025-01-30 DIAGNOSIS — K50.019 CROHN'S DISEASE OF SMALL INTESTINE WITH COMPLICATION (MULTI): Primary | ICD-10-CM

## 2025-01-30 RX ORDER — FAMOTIDINE 10 MG/ML
20 INJECTION INTRAVENOUS ONCE AS NEEDED
OUTPATIENT
Start: 2025-05-06

## 2025-01-30 RX ORDER — ALBUTEROL SULFATE 0.83 MG/ML
3 SOLUTION RESPIRATORY (INHALATION) AS NEEDED
OUTPATIENT
Start: 2025-05-06

## 2025-01-30 RX ORDER — INFLIXIMAB 100 MG/10ML
300 INJECTION, POWDER, LYOPHILIZED, FOR SOLUTION INTRAVENOUS SEE ADMIN INSTRUCTIONS
Qty: 3 EACH | Refills: 2 | OUTPATIENT
Start: 2025-01-30 | End: 2025-03-13

## 2025-01-30 RX ORDER — INFLIXIMAB 100 MG/10ML
300 INJECTION, POWDER, LYOPHILIZED, FOR SOLUTION INTRAVENOUS
Qty: 3 EACH | Refills: 3 | OUTPATIENT
Start: 2025-01-30

## 2025-01-30 RX ORDER — EPINEPHRINE 0.3 MG/.3ML
0.3 INJECTION SUBCUTANEOUS EVERY 5 MIN PRN
OUTPATIENT
Start: 2025-05-06

## 2025-01-30 RX ORDER — DIPHENHYDRAMINE HYDROCHLORIDE 50 MG/ML
50 INJECTION INTRAMUSCULAR; INTRAVENOUS AS NEEDED
OUTPATIENT
Start: 2025-01-30

## 2025-01-30 RX ORDER — DIPHENHYDRAMINE HYDROCHLORIDE 50 MG/ML
50 INJECTION INTRAMUSCULAR; INTRAVENOUS AS NEEDED
OUTPATIENT
Start: 2025-05-06

## 2025-01-30 RX ORDER — EPINEPHRINE 0.3 MG/.3ML
0.3 INJECTION SUBCUTANEOUS EVERY 5 MIN PRN
OUTPATIENT
Start: 2025-01-30

## 2025-01-30 RX ORDER — ALBUTEROL SULFATE 0.83 MG/ML
3 SOLUTION RESPIRATORY (INHALATION) AS NEEDED
OUTPATIENT
Start: 2025-01-30

## 2025-01-30 RX ORDER — FAMOTIDINE 10 MG/ML
20 INJECTION INTRAVENOUS ONCE AS NEEDED
OUTPATIENT
Start: 2025-01-30

## 2025-02-03 DIAGNOSIS — M72.2 PLANTAR FASCIITIS, BILATERAL: ICD-10-CM

## 2025-02-03 RX ORDER — IBUPROFEN 600 MG/1
600 TABLET ORAL 3 TIMES DAILY PRN
Qty: 270 TABLET | Refills: 0 | Status: SHIPPED | OUTPATIENT
Start: 2025-02-03 | End: 2026-02-03

## 2025-02-13 ENCOUNTER — SPECIALTY PHARMACY (OUTPATIENT)
Dept: PHARMACY | Facility: CLINIC | Age: 65
End: 2025-02-13

## 2025-02-14 ENCOUNTER — TELEMEDICINE CLINICAL SUPPORT (OUTPATIENT)
Dept: PHARMACY | Facility: HOSPITAL | Age: 65
End: 2025-02-14
Payer: COMMERCIAL

## 2025-02-14 DIAGNOSIS — K50.018 CROHN'S DISEASE OF SMALL INTESTINE WITH OTHER COMPLICATION: ICD-10-CM

## 2025-02-14 RX ORDER — INFLIXIMAB 100 MG/10ML
5 INJECTION, POWDER, LYOPHILIZED, FOR SOLUTION INTRAVENOUS SEE ADMIN INSTRUCTIONS
Qty: 3 EACH | Refills: 2 | Status: SHIPPED
Start: 2025-02-14

## 2025-02-14 RX ORDER — INFLIXIMAB 100 MG/10ML
5 INJECTION, POWDER, LYOPHILIZED, FOR SOLUTION INTRAVENOUS
Qty: 3 EACH | Refills: 3 | Status: SHIPPED
Start: 2025-02-14

## 2025-02-14 NOTE — PROGRESS NOTES
Reordered specialty prescription following telepharmacy pursuant to pharmacist-provider collaborative practice agreement.

## 2025-02-14 NOTE — PROGRESS NOTES
[facility] Clinical Note  Initial Patient Education   Healthcare Provider Administered Medication    Introduction  Michelle Abbott is a 64 y.o. female who is on the specialty pharmacy service for management of: Specialty Infusion Non-Core.    Michelle Abbott is initiating the following therapy: Infliximab     Initial Administration Date (planned or actual): TBD - pending patient visit with Dr. Jaimes and pending insurance approval   Administration location: [facility]     Duration of therapy: Maintenance    The most recent encounter visit with the referring prescriber Argelia Allen on 1/16/2025 was reviewed.  Pharmacy will continue to collaborate in the care of this patient with the referring prescriber.    Education/Discussion  Michelle was contacted on 2/14/2025 at 3:34 PM for a pharmacy visit with encounter number [id number] from:   [facility]  [id number]  [facility] [address]  Dept: [phone]  Dept Fax: [phone]  Loc: [phone]  Michelle consented to a/an Telephone visit, which was performed.      Education discussed includes the following (medication specific):   Clinical Background   Labs for clinical appropriateness that were reviewed include:     Gastroenterology - CBC-diff:   Lab Results   Component Value Date    WBC 5.4 01/17/2025    RBC 5.37 (H) 01/17/2025    HGB 13.7 01/17/2025    HCT 43.5 01/17/2025    MCV 81 01/17/2025    MCHC 31.5 (L) 01/17/2025     01/17/2025    RDW 13.4 01/17/2025    NEUTOPHILPCT 64.8 11/23/2020    IGPCT 0.8 11/23/2020    LYMPHOPCT 28.5 11/23/2020    MONOPCT 4.4 11/23/2020    EOSPCT 0.8 11/23/2020    BASOPCT 0.7 11/23/2020    NEUTROABS 3.81 11/23/2020    LYMPHSABS 1.68 11/23/2020    MONOSABS 0.26 11/23/2020    EOSABS 0.05 11/23/2020    BASOSABS 0.04 11/23/2020   , CMP:   Lab Results   Component Value Date    GLUCOSE 88 01/17/2025     01/17/2025    K  4.3 01/17/2025     01/17/2025    CO2 27 01/17/2025    ANIONGAP 13 01/17/2025    BUN 16 01/17/2025    CREATININE 0.87 01/17/2025    CALCIUM 9.4 01/17/2025    ALBUMIN 4.2 01/17/2025    ALKPHOS 67 01/17/2025    PROT 6.8 01/17/2025    AST 15 01/17/2025    BILITOT 0.7 01/17/2025    ALT 9 01/17/2025   , TB:   Lab Results   Component Value Date    TBSIN Negative 01/17/2025   , Hepatitis B panel:   Lab Results   Component Value Date    HEPBCAB Nonreactive 01/17/2025    HEPBSAG Nonreactive 01/17/2025    HEPBSAB 3.6 01/17/2025   , and Hepatitis C antibody:   Lab Results   Component Value Date    HEPCAB NON-REACTIVE 05/14/2019      Indication:   Infliximab is a monoclonal antibody that binds to and neutralizes TNF-? on the cell membrane, soluble TNF-?, and destroys TNF-? producing cell, thus inhibiting inflammation. It is being used specifically for this patients Crohn's disease    Dose:  Crohn's disease - IV: 300 mg at 0, 2, and 6 weeks, then every 8 weeks thereafter starting at week 14.     Administration details:  Your medication will be administered via intravenous infusion. The administration will take approximately one to two hours, and your total appointment will last about three hours. If tolerated, administration and total appointment duration may shorten. You will receive further education onsite regarding the specifics of your appointment. Pre-medication will be administered based on provider orders    Warnings, Precautions, and Adverse Reactions:  - Discussed common adverse effects including respiratory infections, such as sinus infections and sore throat, headache, cough, stomach pain, and nausea   - Discussed warnings and precautions such as allergic reactions, new-onset or exacerbation of demyelinating disorders, hematologic disorders, worsening and new-onset heart failure, increased risk of serious infections including TB, and increased risk of malignancy.   - Patient was encouraged to reach out to their  doctor’s office if they develop: signs of an allergic reaction, signs of an infection, signs of lupus, signs of high or low blood pressure, weakness on 1 side of the body, trouble speaking or thinking, change in balance, drooping on one side of the face, or blurred eyesight, unexplained bruising or bleeding, shortness of breath, a big weight gain, or new or worsening swelling in the arms or legs.?   - Patient was encouraged to get any LIVE vaccines prior to starting treatment. Live vaccines should be avoided while on infliximab.??     Post Administration Considerations:  You will be closely monitored by clinic staff during and after the administration. However, if you experience any serious reactions after leaving the clinic, please contact your physician's office immediately or call 911.    Timeline and Goals:  The follow up timeline was discussed. Every person responds to and reacts to therapy differently. Patient should be assessed for efficacy and tolerability in approximately: annually    Provided education on goals and possible outcomes of therapy:  Adherence with therapy  Timely completion of appropriate labs  Timely and appropriate follow up with provider  Identify and address medication interactions with presciption medications, OTC medications and supplements  Optimize or maintain quality of life  Gastroenterology: Improve gastrointestinal clinical symptoms such as abdominal pain, inflammation, diarrhea, constipation, and bleeding, Reduce frequency and urgency of bowel movements, and Allow for GI mucosal healing (observed through endoscopy and colonoscopy)     Adherence:  Importance of adherence discussed with patient and they were advised to use a calendar to keep track of upcoming administration appointments. Encouraged patient to call their physician office and/or infusion center (if applicable) if they have a question on a missed dose.     What barriers to adherence does the patient have?    Patient  does not think medication is beneficial     If barrier identified, what additional action was taken?   Patient education on medication and benefits    Patient was advised of the [facility] contact information ([place] [phone], [place] [phone], [facility] [phone]). Patient confirmed understanding of education conducted during assessment. All patient questions and concerns were addressed to the best of my ability. Patient was encouraged to contact the [facility] with any questions or concerns.     Claire Castillo, PharmD

## 2025-03-05 ENCOUNTER — DOCUMENTATION (OUTPATIENT)
Dept: INFUSION THERAPY | Facility: CLINIC | Age: 65
End: 2025-03-05
Payer: COMMERCIAL

## 2025-03-05 NOTE — PROGRESS NOTES
CLINICAL CLEARANCE FOR OUTPATIENT INFUSION      Patient to be scheduled for New Start of Infliximab infusions.    For a Diagnosis of: Crohn's disease of small intestine with complication (Multi)     Dosing is weight based at: 5 mg/kg  Dosing weight of: 62.2 kg  For a total dose of: 300 mg at weeks 0, 2 & 6 (induction) then every 56 days thereafter (maintenance)    Labs Required Prior to First Treatment (place lab order if not already ordered or completed):  TB drawn/results:   Lab Results   Component Value Date    TBSIN Negative 01/17/2025      Hep B SAg drawn/results:   Lab Results   Component Value Date    HEPBSAG Nonreactive 01/17/2025      Hep B Core antibody:   Lab Results   Component Value Date    HEPBCAB Nonreactive 01/17/2025     Lab Results   Component Value Date    HEPBCAB Nonreactive 01/17/2025    HEPCAB NON-REACTIVE 05/14/2019        Baseline labs for review as available:    CBC drawn: (if available)   Lab Results   Component Value Date    WBC 5.4 01/17/2025    HGB 13.7 01/17/2025    HCT 43.5 01/17/2025    MCV 81 01/17/2025     01/17/2025      CMP drawn: (if available)     Chemistry    Lab Results   Component Value Date/Time     01/17/2025 0728    K 4.3 01/17/2025 0728     01/17/2025 0728    CO2 27 01/17/2025 0728    BUN 16 01/17/2025 0728    CREATININE 0.87 01/17/2025 0728    Lab Results   Component Value Date/Time    CALCIUM 9.4 01/17/2025 0728    ALKPHOS 67 01/17/2025 0728    AST 15 01/17/2025 0728    ALT 9 01/17/2025 0728    BILITOT 0.7 01/17/2025 0728           CRP drawn: (If available)   Lab Results   Component Value Date    CRP 0.40 01/17/2025        Does the patient have a history of demyelinating disease such as multiple sclerosis, optic neuritis, transverse myelitis, Guillain-[place] syndrome , chronic inflammatory demyelinating polyneuropathy (CIDP)? No  (If Yes - confirm that the provider is aware and has approved treatment. New onset or exacerbation of demyelinating disease  may occur with infliximab)    Does the patient have a history of heart failure? No  (If Yes - confirm that the provider is aware and has approved treatment. New onset or worsening HF have been reported with infliximab) IV doses >5 mg/kg in patients with moderate or severe heart failure (NYHA class III/IV) are contraindicated    Any history of malignancy, especially lymphoma? No   (If Yes - confirm that the provider is aware and has approved treatment)    Patient Active Problem List   Diagnosis    Abnormal mammogram    COVID-19 virus infection    Eczema    Elevated LDL cholesterol level    Fear of flying    Incisional hernia, without obstruction or gangrene    Vitamin D insufficiency    Left ankle pain    Crohn's disease of small intestine      Past Medical History:   Diagnosis Date    Acute bronchospasm 12/07/2018    Acute bronchospasm    Acute upper respiratory infection, unspecified 10/30/2020    Acute URI    Asthma     Chronic sinusitis, unspecified 12/07/2018    Sinobronchitis    Other conditions influencing health status 07/26/2013    Mammogram    Personal history of other diseases of the respiratory system 01/09/2019    History of acute bacterial sinusitis    Personal history of other specified conditions 12/29/2016    History of abdominal pain    Urinary tract infection, site not specified 10/30/2020    Acute urinary tract infection        Last infusion received: N/A (if continuation)   Due: Anytime-PAR to schedule patient for infusion.     Induction and Maintenance Therapy Plans entered if needed? Yes (if no prescribing provider notified of need to enter)    Okay to schedule for treatment as ordered by prescribing provider

## 2025-03-21 ENCOUNTER — TELEPHONE (OUTPATIENT)
Dept: GASTROENTEROLOGY | Facility: CLINIC | Age: 65
End: 2025-03-21
Payer: COMMERCIAL

## 2025-03-21 NOTE — TELEPHONE ENCOUNTER
Patient approved for remicade infusions. Infusion center has attempted to call and schedule patient. No infusion apts on schedule made. I left patient a VM to follow up on this. She does have a scheduled follow up with Dr. Jaimes on 4/8 as well.

## 2025-04-02 NOTE — PROGRESS NOTES
REASON FOR VISIT:  IBD    HPI:  Michelle Abbott is a 64 y.o. female who presents for a follow up visit     Patient here for a follow up  She has been approved to start Remicade infusions. She has not started as of yet . Would like to discuss medication   She is finishing budesonide taper on 4/15  Perforated appendicitis in 2000 - required HAILEE drain, extensive surgery - she would have intermittent RLQ pain that she attributes to scar tissue  She took budesonide taper that improved her symptoms - overall pain, less diarrhea - slight increase in sx as she tapered  daily BM's- denies blood mucous or melena . Can be loose depending on nutrition - will take imodium prn   Intermittent right lower abdominal pressure   the patient denies any skin rashes , joint pain or stiffness , mucous membrane ulcerations or eye pain / visual disturbances.  weight stable       Med list notable for    Labs notable for    No fhx of CRC.       Prev endoscopic eval:    Colonoscopy - 12/12/24 - Dr Stiles   Impression  Abnormal mucosa with erosion in the terminal ileum, ileocecal valve and cecum; questionable NSAID induced? performed cold forceps biopsy to rule out IBD  Scattered diverticulosis of moderate severity in the transverse colon, descending colon and sigmoid colon     Repeat in 5 years -  12/12/2029     Biopsy Results -      Chronic active nonspecific ileitis.  No granulomas identified.  The findings are nonspecific and the differential diagnosis includes medication-induced injury versus inflammatory bowel disease.  Scattered diverticulosis of moderate severity in the transverse colon, descending colon and sigmoid colon.     Cecum biopsy-focal active colitis -medication induced injury versus inflammatory bowel disease    REVIEW OF SYSTEMS    Review of Systems   Constitutional: Negative for chills, decreased appetite, fever and weight loss.   Respiratory:  Negative for cough and shortness of breath.    Gastrointestinal:  Positive for  abdominal pain and diarrhea. Negative for constipation, hematemesis, hematochezia, nausea and vomiting.          Allergies   Allergen Reactions    Codeine Other     hallucinations    Sulfa (Sulfonamide Antibiotics) Unknown       Past Medical History:   Diagnosis Date    Acute bronchospasm 12/07/2018    Acute bronchospasm    Acute upper respiratory infection, unspecified 10/30/2020    Acute URI    Asthma     Chronic sinusitis, unspecified 12/07/2018    Sinobronchitis    Other conditions influencing health status 07/26/2013    Mammogram    Personal history of other diseases of the respiratory system 01/09/2019    History of acute bacterial sinusitis    Personal history of other specified conditions 12/29/2016    History of abdominal pain    Urinary tract infection, site not specified 10/30/2020    Acute urinary tract infection       Past Surgical History:   Procedure Laterality Date    APPENDECTOMY         Family History   Problem Relation Name Age of Onset    Colon cancer Father         Social History     Tobacco Use    Smoking status: Never     Passive exposure: Never    Smokeless tobacco: Never   Substance Use Topics    Alcohol use: Yes     Comment: social       Current Outpatient Medications   Medication Sig Dispense Refill    albuterol 90 mcg/actuation inhaler Inhale 2 puffs every 6 hours if needed for wheezing. 18 g 11    budesonide EC (Entocort EC) 3 mg 24 hr capsule Take 1 capsule (3 mg) by mouth once daily in the morning. Take 3 capsules daily for 30 days ,then 2 capsules daily for 3 days, then 1 capsule daily for 3 days and stop 90 capsule 1    estradiol (Estrace) 0.01 % (0.1 mg/gram) vaginal cream APPLY PEA SIZE AMOUNT TO VAGINA NIGHTLY TWICE A WEEK 126 g 1    fluticasone (Flonase) 50 mcg/actuation nasal spray Administer 1 spray into each nostril once daily. Shake gently. Before first use, prime pump. After use, clean tip and replace cap. 16 g 5    ibuprofen 600 mg tablet TAKE 1 TABLET (600 MG) BY MOUTH 3  TIMES A DAY AS NEEDED FOR MILD PAIN (1 - 3) (PAIN). 270 tablet 0    inFLIXimab (Remicade) 100 mg injection Infuse 300 mg (5 mg/kg) into a venous catheter every 8 (eight) weeks. 3 each 3    inFLIXimab (Remicade) 100 mg injection Infuse 300 mg (5 mg/kg) into a venous catheter see administration instructions.  For Induction:  0, 2 and 6 weeks 3 each 2     No current facility-administered medications for this visit.       PHYSICAL EXAM:  There were no vitals taken for this visit.     Physical Exam  Constitutional:       Comments: Awake   HENT:      Head: Normocephalic.   Cardiovascular:      Rate and Rhythm: Normal rate and regular rhythm.   Pulmonary:      Effort: Pulmonary effort is normal.      Breath sounds: Normal breath sounds.   Abdominal:      General: Bowel sounds are normal.      Palpations: Abdomen is soft.      Tenderness: There is abdominal tenderness.   Neurological:      Mental Status: She is alert.   Psychiatric:         Mood and Affect: Mood normal.          ASSESSMENT    64-year-old female presents for follow-up to discuss small bowel Crohn's disease.  She underwent a colonoscopy that identified aphthous ulcers in the terminal ileum and cecum concerning for IBD versus NSAID induced colitis.  CT enterography identified 15 to 20 cm of distal ileal wall thickening and hyperenhancement.  She has a long history of right lower quadrant abdominal pain since having a perforated appendix in 2000.  She was given a trial of budesonide and noted improvement in her right lower quadrant abdominal pain and intermittent diarrhea that has slightly increased as she tapered.  She is scheduled to complete her budesonide taper next week.  IBD serology favored IBD  We had a long discussion regarding the natural history of inflammatory bowel disease and potential complications as well as biologic therapy.  Given that she did improve with budesonide and has evidence of inflammation endoscopically and on CT urography I would  recommend proceeding with biologic therapy.  We discussed options such as anti-TNF Remicade versus Humira as well as other options.  She has been approved for Remicade infusions.  She would like to continue to consider her options and she will contact the office.  We will schedule her for follow-up visit in 3 months.    Evaluation requested by Dr. Ashleigh Jacques MD.   My final recommendations will be communicated back to the requesting physician by way of shared Medical record or letter to requesting physician via fax.      Attending Note:   I have personally performed a face to face assessment of this Patient, which included an interview, physical exam and Assessment and Plan.  I have reviewed and confirmed the history and key findings as documented by the Medical Assistant and edited as appropriate.     Signature: Coni Jaimes MD    Date: 4/2/2025  Time: 1:24 PM

## 2025-04-08 ENCOUNTER — APPOINTMENT (OUTPATIENT)
Dept: GASTROENTEROLOGY | Facility: CLINIC | Age: 65
End: 2025-04-08
Payer: COMMERCIAL

## 2025-04-08 VITALS
DIASTOLIC BLOOD PRESSURE: 80 MMHG | BODY MASS INDEX: 24.8 KG/M2 | RESPIRATION RATE: 16 BRPM | HEIGHT: 63 IN | TEMPERATURE: 99.5 F | WEIGHT: 140 LBS | SYSTOLIC BLOOD PRESSURE: 125 MMHG | HEART RATE: 69 BPM | OXYGEN SATURATION: 96 %

## 2025-04-08 DIAGNOSIS — K50.00 CROHN'S DISEASE OF SMALL INTESTINE WITHOUT COMPLICATION (MULTI): Primary | ICD-10-CM

## 2025-04-08 PROCEDURE — 99214 OFFICE O/P EST MOD 30 MIN: CPT | Performed by: INTERNAL MEDICINE

## 2025-04-08 PROCEDURE — 1036F TOBACCO NON-USER: CPT | Performed by: INTERNAL MEDICINE

## 2025-04-08 PROCEDURE — 3008F BODY MASS INDEX DOCD: CPT | Performed by: INTERNAL MEDICINE

## 2025-04-08 ASSESSMENT — ENCOUNTER SYMPTOMS
FEVER: 0
COUGH: 0
NAUSEA: 0
CHILLS: 0
DECREASED APPETITE: 0
SHORTNESS OF BREATH: 0
HEMATOCHEZIA: 0
CONSTIPATION: 0
HEMATEMESIS: 0
VOMITING: 0
WEIGHT LOSS: 0
DIARRHEA: 1
ABDOMINAL PAIN: 1

## 2025-04-15 ENCOUNTER — APPOINTMENT (OUTPATIENT)
Dept: GASTROENTEROLOGY | Facility: CLINIC | Age: 65
End: 2025-04-15
Payer: COMMERCIAL

## 2025-07-09 ENCOUNTER — TELEPHONE (OUTPATIENT)
Dept: GASTROENTEROLOGY | Facility: CLINIC | Age: 65
End: 2025-07-09
Payer: COMMERCIAL

## 2025-07-09 ENCOUNTER — PATIENT MESSAGE (OUTPATIENT)
Dept: GASTROENTEROLOGY | Facility: CLINIC | Age: 65
End: 2025-07-09
Payer: COMMERCIAL

## 2025-07-09 NOTE — TELEPHONE ENCOUNTER
I spoke with this patient over the phone. Reviewed remicade orders with her along with co pay assistance program. Auth still in place. Will remove hold so patient can schedule infusions. We will then get her scheduled for follow up apt after third induction dose.

## 2025-07-09 NOTE — TELEPHONE ENCOUNTER
Returned patient's call re; Appointment with Argelia. She has not started infusions yet, because she was assuming the infusion center was going to call her.     I gave the patient infusion center information. I let her know that she can call to make appointments.     She does not want to keep her appointment with Argelia as she has not started treatment.

## 2025-07-10 ENCOUNTER — APPOINTMENT (OUTPATIENT)
Dept: GASTROENTEROLOGY | Facility: CLINIC | Age: 65
End: 2025-07-10
Payer: COMMERCIAL

## 2025-07-10 DIAGNOSIS — K50.019 CROHN'S DISEASE OF SMALL INTESTINE WITH COMPLICATION (MULTI): Primary | ICD-10-CM

## 2025-07-21 DIAGNOSIS — K50.019 CROHN'S DISEASE OF SMALL INTESTINE WITH COMPLICATION (MULTI): Primary | ICD-10-CM

## 2025-07-31 ENCOUNTER — APPOINTMENT (OUTPATIENT)
Dept: INFUSION THERAPY | Facility: CLINIC | Age: 65
End: 2025-07-31
Payer: COMMERCIAL

## 2025-07-31 VITALS
DIASTOLIC BLOOD PRESSURE: 70 MMHG | BODY MASS INDEX: 25.33 KG/M2 | TEMPERATURE: 97 F | SYSTOLIC BLOOD PRESSURE: 120 MMHG | RESPIRATION RATE: 16 BRPM | WEIGHT: 142.97 LBS | HEART RATE: 66 BPM | OXYGEN SATURATION: 98 %

## 2025-07-31 DIAGNOSIS — K50.019 CROHN'S DISEASE OF SMALL INTESTINE WITH COMPLICATION (MULTI): ICD-10-CM

## 2025-07-31 PROCEDURE — 96413 CHEMO IV INFUSION 1 HR: CPT | Performed by: REGISTERED NURSE

## 2025-07-31 PROCEDURE — 96415 CHEMO IV INFUSION ADDL HR: CPT | Performed by: REGISTERED NURSE

## 2025-07-31 RX ORDER — DIPHENHYDRAMINE HYDROCHLORIDE 50 MG/ML
50 INJECTION, SOLUTION INTRAMUSCULAR; INTRAVENOUS AS NEEDED
OUTPATIENT
Start: 2025-08-14

## 2025-07-31 RX ORDER — EPINEPHRINE 0.3 MG/.3ML
0.3 INJECTION SUBCUTANEOUS EVERY 5 MIN PRN
OUTPATIENT
Start: 2025-08-14

## 2025-07-31 RX ORDER — ALBUTEROL SULFATE 0.83 MG/ML
3 SOLUTION RESPIRATORY (INHALATION) AS NEEDED
OUTPATIENT
Start: 2025-08-14

## 2025-07-31 RX ORDER — FAMOTIDINE 10 MG/ML
20 INJECTION, SOLUTION INTRAVENOUS ONCE AS NEEDED
OUTPATIENT
Start: 2025-08-14

## 2025-07-31 ASSESSMENT — ENCOUNTER SYMPTOMS
MYALGIAS: 1
PALPITATIONS: 0
LEG SWELLING: 0
SORE THROAT: 0
APPETITE CHANGE: 0
VOICE CHANGE: 0
HEADACHES: 0
WHEEZING: 0
FREQUENCY: 0
NUMBNESS: 0
SHORTNESS OF BREATH: 0
ARTHRALGIAS: 1
FEVER: 0
DIZZINESS: 0
EYE PROBLEMS: 0
DYSURIA: 0
ABDOMINAL PAIN: 1
TROUBLE SWALLOWING: 0
VOMITING: 0
COUGH: 0
WOUND: 0
FATIGUE: 1
UNEXPECTED WEIGHT CHANGE: 0
LIGHT-HEADEDNESS: 0
CHILLS: 0
CONSTIPATION: 0
EXTREMITY WEAKNESS: 0
NAUSEA: 0
BLOOD IN STOOL: 0
BRUISES/BLEEDS EASILY: 0
DIARRHEA: 1
HEMATURIA: 0

## 2025-07-31 NOTE — PROGRESS NOTES
[facility]   Infusion Clinic Note   Date: 2025   Name: Michelle Abbott  : 1960   MRN: [id number]         Reason for Visit: OP Infusion (REMICADE 300 MG IV INFUSION. WEEK 0. )         Today: We administered inFLIXimab (Remicade) 300 mg in sodium chloride 0.9% 250 mL IV.       Referring Provider: Argelia Allen APRN-CNP    Plan Provider: SIMON Go      For a Diagnosis of: Crohn's disease of small intestine with complication (Multi)       At today's visit patient accompanied by:       Today's Vitals:   Vitals:    25 0701 25 0909 25 0952   BP: 145/80 125/72 120/70   Pulse: 72 77 66   Resp: 16 16 16   Temp: 36.1 °C (97 °F) 36.1 °C (97 °F) 36.1 °C (97 °F)   SpO2: 98% 98% 98%   Weight: 64.8 kg (142 lb 15.5 oz)               Pre - Treatment Checklist:      - Previous reaction to current treatment: n/a      (Assess patient for the concerns below. Document provider notification as appropriate).  - Active or recent infection with/without current antibiotic use: no  - Recent or planned invasive dental work: no  - Recent or planned surgeries: no  - Recently received or plans to receive vaccinations: no  - Has treatment related toxicities: n/a  - Any chance may be pregnant:  n/a      Pain: 0   - Is the pain different from normal: n/a   - Is prescribing Doctor aware:  n/a      Labs: Reviewed       Fall Risk Screening: Booth Fall Risk  History of Falling, Immediate or Within 3 Months: No  Secondary Diagnosis: No  Ambulatory Aid: Walks without aid/bedrest/nurse assist  Intravenous Therapy/Heparin Lock: Yes  Gait/Transferring: Normal/bedrest/immobile  Mental Status: Oriented to own ability  Booth Fall Risk Score: 20            Review Of Systems:  Review of Systems   Constitutional:  Positive for fatigue. Negative for appetite change, chills, fever and unexpected weight change.   HENT:   Negative for hearing loss, mouth sores, sore throat, tinnitus, trouble  swallowing and voice change.    Eyes:  Negative for eye problems.   Respiratory:  Negative for cough, shortness of breath and wheezing.    Cardiovascular:  Negative for chest pain, leg swelling and palpitations.   Gastrointestinal:  Positive for abdominal pain (intermittent) and diarrhea. Negative for blood in stool, constipation, nausea and vomiting.   Genitourinary:  Negative for dysuria, frequency and hematuria.    Musculoskeletal:  Positive for arthralgias and myalgias.   Skin:  Negative for itching, rash and wound.   Neurological:  Negative for dizziness, extremity weakness, headaches, light-headedness and numbness.   Hematological:  Does not bruise/bleed easily.         Infusion Readiness:  - Assessment Concerns Related to Infusion: No  - Provider notified: no      New Patient Education:    NEW PATIENT MEDICATION EDUCATION PT PROVIDED WITH WRITTEN ([facility] PT EDUCATION SHEET) AND VERBAL EDUCATION REGARDING MEDICATION GIVEN. VERIFIED MEDICATION NAME WITH PATIENT AND DISCUSSED REASON FOR USE. BRIEFLY DISCUSSED HOW MEDICATION WORKS AND EDUCATED ON GOAL OF TREATMENT, FREQUENCY OF TREATMENT, ADVERSE RXN'S AND COMMON SIDE EFFECTS TO MONITOR FOR. INSTRUCTED PT TO ASSURE THAT ALL PROVIDERS INCLUDING DENTISTS ARE AWARE OF MEDICATION RECEIVED. DISCUSSED FLOW OF VISIT AND ORIENTED TO INFUSION CENTER. PT VERBALIZES UNDERSTANDING. CALL LIGHT PROVIDED AND PT AWARE TO ALERT STAFF OF ANY CONCERNS DURING TREATMENT.        Treatment Conditions & Drug Specific Questions:    InFLIXimab  (AVSOLA, INFLECTRA, REMICADE, RENFLEXIS)    (Unless otherwise specified on patient specific therapy plan):     TREATMENT CONDITIONS:  Unless otherwise specified on patient specific therapy plan HOLD and notify Provider prior to proceeding with today's infusion if patient with:  o Positive T-Spot  o Reactive Hep B sAg and/or Hep B Core Antibody    Lab Results   Component Value Date    TBSIN Negative 01/17/2025      Lab Results   Component Value  "Date    HEPBSAG Nonreactive 01/17/2025      No results found for: \"NONUHFIRE\", \"NONUHSWGH\", \"NONUHFISH\"  Lab Results   Component Value Date    HEPBCAB Nonreactive 01/17/2025     Lab Results   Component Value Date    HEPBCAB Nonreactive 01/17/2025    HEPCAB NON-REACTIVE 05/14/2019        Patient meets treatment conditions? Yes    DRUG SPECIFIC QUESTIONS:    Any new or worsening signs / symptoms of heart failure which may include things like worsening shortness of breath, swelling, fatigue? No   (If yes notify provider before proceeding with today's infusion. New onset or worsening HF have been reported with infliximab)    REMINDER:   WEIGHT BASED DRUG     Recommended Vitals/Observation:  Vitals:     Induction: Obtain vital signs every 30 minutes; at end of observation period and as needed.     Maintenance: Obtain vital signs at start and end of infusions  Observation:     Induction: Patient is monitored for 30 minutes post-infusion      Maintenance: No observation.        Weight Based Drug Calculations:    WEIGHT BASED DRUGS: Infliximab (REMICADE, INFLECTRA, RENFLEXIS)   Patient's dosing weight (kg): 62.2     10% weight variance for prescribed treatment: 55.98 kg to 68.42 kg     Patient's weight today:   Vitals:    07/31/25 0701   Weight: 64.8 kg (142 lb 15.5 oz)         weight range for prescribed dose:     Patient weight today falls outside of 10% variance or  weight range: No     Home Care pharmacist informed of weight variance: Not applicable    Doses that are weight based have an acceptable variance rule within 10% of the prescribed   order and/or within  weight range. If patient weight on day of infusion falls   outside of the 10% variance, or weight range, infusion is administered and   pharmacy contacted regarding future dosing adjustments, per policy.      Post Treatment: Patient tolerated treatment without issue and was discharged in no apparent distress.      Note " Authored / Patient Cared for By: Mere Louis RN

## 2025-07-31 NOTE — TELEPHONE ENCOUNTER
Francisco Martinez!    I am so sorry  I missed you today! Are you needing assistance with signing up for the co pay assistance?

## 2025-07-31 NOTE — PATIENT INSTRUCTIONS
Today you received:   REMICADE 300 MG IV INFUSION.    NEXT APPT 2 WEEKS.     For:    1. Crohn's disease of small intestine with complication (Multi)            Please read the  Medication Guide that was given to you and reviewed during todays visit.     (Tell all doctors including dentists that you are taking this medication)     Go to the emergency room or call 911 if:  -You have signs of allergic reaction:   o         Rash, hives, itching.   o         Swollen, blistered, peeling skin.   o         Swelling of face, lips, mouth, tongue or throat.   o         Tightness of chest, trouble breathing, swallowing or talking      Call your doctor:     - If IV / injection site gets red, warm, swollen, itchy or leaks fluid or pus.     (Leave dressing on your IV site for at least 2 hours and keep area clean and dry    - If you get sick or have symptoms of infection or are not feeling well for any reason.    (Wash your hands often, stay away from people who are sick)    - If you have side effects from your medication that do not go away or are bothersome.     (Refer to the teaching your nurse gave you for side effects to call your doctor about)     Common side effects may include:  stuffy nose, headache, feeling tired, muscle aches, upset stomach    - Before receiving any vaccines, Call the [facility] at [phone]     - You get sick, are on antibiotics, have had a recent vaccine, have surgery or dental work and your doctor wants your visit rescheduled.    - You need to cancel and reschedule your visit for any reason. Call at least 2 days before your visit if you need to cancel.     - Your insurance changes before your next visit.    (We will need to get approval from your new insurance. This can take up to two weeks.)     The [facility] is opened Monday thru Friday 8am-8pm and Saturday 8am-4:30pm. We are closed on holidays.     Voice mail will take your call if the center is closed. If you  leave a message please allow 24 hours for a call back during weekdays. If you leave a message on a weekend/holiday, we will call you back the next business day.

## 2025-08-14 ENCOUNTER — APPOINTMENT (OUTPATIENT)
Dept: INFUSION THERAPY | Facility: CLINIC | Age: 65
End: 2025-08-14
Payer: COMMERCIAL

## 2025-08-14 VITALS
BODY MASS INDEX: 25.6 KG/M2 | DIASTOLIC BLOOD PRESSURE: 82 MMHG | HEART RATE: 61 BPM | WEIGHT: 144.51 LBS | SYSTOLIC BLOOD PRESSURE: 137 MMHG | RESPIRATION RATE: 16 BRPM | OXYGEN SATURATION: 98 % | TEMPERATURE: 97 F

## 2025-08-14 DIAGNOSIS — K50.019 CROHN'S DISEASE OF SMALL INTESTINE WITH COMPLICATION (MULTI): ICD-10-CM

## 2025-08-14 PROCEDURE — 96415 CHEMO IV INFUSION ADDL HR: CPT | Performed by: REGISTERED NURSE

## 2025-08-14 PROCEDURE — 96413 CHEMO IV INFUSION 1 HR: CPT | Performed by: REGISTERED NURSE

## 2025-08-14 RX ORDER — DIPHENHYDRAMINE HYDROCHLORIDE 50 MG/ML
50 INJECTION, SOLUTION INTRAMUSCULAR; INTRAVENOUS AS NEEDED
OUTPATIENT
Start: 2025-09-11

## 2025-08-14 RX ORDER — FAMOTIDINE 10 MG/ML
20 INJECTION, SOLUTION INTRAVENOUS ONCE AS NEEDED
OUTPATIENT
Start: 2025-09-11

## 2025-08-14 RX ORDER — EPINEPHRINE 0.3 MG/.3ML
0.3 INJECTION SUBCUTANEOUS EVERY 5 MIN PRN
OUTPATIENT
Start: 2025-09-11

## 2025-08-14 RX ORDER — ALBUTEROL SULFATE 0.83 MG/ML
3 SOLUTION RESPIRATORY (INHALATION) AS NEEDED
OUTPATIENT
Start: 2025-09-11

## 2025-08-14 ASSESSMENT — ENCOUNTER SYMPTOMS
SORE THROAT: 0
LIGHT-HEADEDNESS: 0
EXTREMITY WEAKNESS: 0
VOMITING: 0
CONSTIPATION: 0
DYSURIA: 0
BLOOD IN STOOL: 0
SHORTNESS OF BREATH: 0
MYALGIAS: 0
BRUISES/BLEEDS EASILY: 0
WOUND: 0
UNEXPECTED WEIGHT CHANGE: 0
HEADACHES: 0
PALPITATIONS: 0
HEMATURIA: 0
ARTHRALGIAS: 0
FREQUENCY: 0
FEVER: 0
DIARRHEA: 0
TROUBLE SWALLOWING: 0
NUMBNESS: 0
DIZZINESS: 0
CHILLS: 0
FATIGUE: 0
EYE PROBLEMS: 0
APPETITE CHANGE: 0
LEG SWELLING: 0
NAUSEA: 0
COUGH: 0
ABDOMINAL PAIN: 0
WHEEZING: 0
VOICE CHANGE: 0

## 2025-09-11 ENCOUNTER — APPOINTMENT (OUTPATIENT)
Dept: INFUSION THERAPY | Facility: CLINIC | Age: 65
End: 2025-09-11
Payer: COMMERCIAL

## 2025-11-10 ENCOUNTER — APPOINTMENT (OUTPATIENT)
Dept: PRIMARY CARE | Facility: CLINIC | Age: 65
End: 2025-11-10
Payer: COMMERCIAL